# Patient Record
Sex: FEMALE | Race: WHITE | NOT HISPANIC OR LATINO | Employment: OTHER | URBAN - METROPOLITAN AREA
[De-identification: names, ages, dates, MRNs, and addresses within clinical notes are randomized per-mention and may not be internally consistent; named-entity substitution may affect disease eponyms.]

---

## 2023-09-13 PROBLEM — K04.7 ABSCESS, DENTAL: Status: ACTIVE | Noted: 2023-09-13

## 2023-09-13 PROBLEM — K21.9 GASTROESOPHAGEAL REFLUX DISEASE: Status: ACTIVE | Noted: 2023-09-13

## 2023-09-13 PROBLEM — E78.00 PURE HYPERCHOLESTEROLEMIA: Status: ACTIVE | Noted: 2023-09-13

## 2023-09-13 PROBLEM — R73.01 IMPAIRED FASTING GLUCOSE: Status: ACTIVE | Noted: 2023-09-13

## 2023-09-13 PROBLEM — S89.90XA INJURY OF LOWER EXTREMITY: Status: ACTIVE | Noted: 2023-09-13

## 2023-09-13 PROBLEM — S99.929A INJURY OF FOOT: Status: ACTIVE | Noted: 2023-09-13

## 2023-09-13 PROBLEM — G44.209 TENSION HEADACHE: Status: ACTIVE | Noted: 2023-09-13

## 2023-09-13 PROBLEM — M79.669 CALF PAIN: Status: ACTIVE | Noted: 2023-09-13

## 2023-09-13 PROBLEM — G45.3 AMAUROSIS FUGAX: Status: ACTIVE | Noted: 2023-09-13

## 2023-09-13 PROBLEM — F41.8 MIXED ANXIETY AND DEPRESSIVE DISORDER: Status: ACTIVE | Noted: 2023-09-13

## 2023-09-13 PROBLEM — R53.1 ASTHENIA: Status: ACTIVE | Noted: 2023-09-13

## 2023-09-13 PROBLEM — R20.2 PARESTHESIA: Status: ACTIVE | Noted: 2023-09-13

## 2023-09-13 PROBLEM — H53.9 DISORDER OF VISION: Status: ACTIVE | Noted: 2023-09-13

## 2023-09-13 PROBLEM — H53.9 VISUAL DISTURBANCE: Status: ACTIVE | Noted: 2023-09-13

## 2023-09-13 PROBLEM — G45.9 TRANSIENT ISCHEMIC ATTACK: Status: ACTIVE | Noted: 2023-09-13

## 2023-09-13 PROBLEM — R09.1 PLEURISY: Status: ACTIVE | Noted: 2023-09-13

## 2023-09-13 PROBLEM — R07.9 CHEST PAIN: Status: ACTIVE | Noted: 2023-09-13

## 2023-09-13 PROBLEM — I10 HYPERTENSIVE DISORDER: Status: ACTIVE | Noted: 2023-09-13

## 2023-09-13 RX ORDER — ACETAMINOPHEN 325 MG/1
650 TABLET ORAL EVERY 6 HOURS PRN
COMMUNITY
Start: 2016-07-18

## 2023-09-13 RX ORDER — IBUPROFEN 600 MG/1
600 TABLET ORAL EVERY 6 HOURS PRN
COMMUNITY
Start: 2016-07-18 | End: 2024-03-05 | Stop reason: ALTCHOICE

## 2023-09-13 RX ORDER — ACETAMINOPHEN, ASPIRIN (NSAID), AND CAFFEINE 250; 250; 65 MG/1; MG/1; MG/1
1 TABLET, FILM COATED ORAL
COMMUNITY
End: 2024-03-05 | Stop reason: ALTCHOICE

## 2023-09-13 RX ORDER — PRAVASTATIN SODIUM 20 MG/1
20 TABLET ORAL DAILY
COMMUNITY
Start: 2016-05-17 | End: 2023-10-31 | Stop reason: ALTCHOICE

## 2023-09-13 RX ORDER — ATORVASTATIN CALCIUM 40 MG/1
40 TABLET, FILM COATED ORAL DAILY
COMMUNITY
Start: 2021-12-17 | End: 2024-03-05 | Stop reason: ALTCHOICE

## 2023-09-13 RX ORDER — FLUOXETINE HYDROCHLORIDE 20 MG/1
20 CAPSULE ORAL EVERY MORNING
COMMUNITY
Start: 2016-05-17 | End: 2024-03-05 | Stop reason: ALTCHOICE

## 2023-09-13 RX ORDER — METHOCARBAMOL 500 MG/1
500 TABLET, FILM COATED ORAL 3 TIMES DAILY PRN
COMMUNITY
Start: 2016-07-18 | End: 2023-10-31 | Stop reason: ALTCHOICE

## 2023-09-13 RX ORDER — EPINEPHRINE 0.22MG
100 AEROSOL WITH ADAPTER (ML) INHALATION DAILY
COMMUNITY
Start: 2013-01-25

## 2023-09-13 RX ORDER — OMEPRAZOLE 20 MG/1
20 TABLET, DELAYED RELEASE ORAL DAILY
COMMUNITY
End: 2024-03-05 | Stop reason: ALTCHOICE

## 2023-09-13 RX ORDER — LISINOPRIL AND HYDROCHLOROTHIAZIDE 20; 25 MG/1; MG/1
1 TABLET ORAL DAILY
COMMUNITY
Start: 2016-05-17 | End: 2024-01-22

## 2023-09-13 RX ORDER — PRAVASTATIN SODIUM 40 MG/1
40 TABLET ORAL DAILY
COMMUNITY
End: 2024-05-02 | Stop reason: SDUPTHER

## 2023-09-13 RX ORDER — SERTRALINE HYDROCHLORIDE 50 MG/1
50 TABLET, FILM COATED ORAL NIGHTLY
COMMUNITY
Start: 2021-12-17 | End: 2023-10-31 | Stop reason: ALTCHOICE

## 2023-09-13 RX ORDER — LANOLIN ALCOHOL/MO/W.PET/CERES
1 CREAM (GRAM) TOPICAL DAILY
COMMUNITY

## 2023-09-13 RX ORDER — CLOPIDOGREL BISULFATE 75 MG/1
75 TABLET ORAL DAILY
COMMUNITY
End: 2023-11-02

## 2023-10-18 ENCOUNTER — HOSPITAL ENCOUNTER (EMERGENCY)
Facility: HOSPITAL | Age: 63
Discharge: HOME | End: 2023-10-18
Attending: EMERGENCY MEDICINE

## 2023-10-18 ENCOUNTER — APPOINTMENT (OUTPATIENT)
Dept: RADIOLOGY | Facility: HOSPITAL | Age: 63
End: 2023-10-18

## 2023-10-18 VITALS
TEMPERATURE: 98.1 F | OXYGEN SATURATION: 96 % | RESPIRATION RATE: 18 BRPM | DIASTOLIC BLOOD PRESSURE: 90 MMHG | BODY MASS INDEX: 23.04 KG/M2 | WEIGHT: 125.22 LBS | HEIGHT: 62 IN | SYSTOLIC BLOOD PRESSURE: 156 MMHG | HEART RATE: 87 BPM

## 2023-10-18 DIAGNOSIS — R05.1 ACUTE COUGH: ICD-10-CM

## 2023-10-18 DIAGNOSIS — J40 BRONCHITIS: Primary | ICD-10-CM

## 2023-10-18 LAB
FLUAV RNA RESP QL NAA+PROBE: NOT DETECTED
FLUBV RNA RESP QL NAA+PROBE: NOT DETECTED
SARS-COV-2 RNA RESP QL NAA+PROBE: NOT DETECTED

## 2023-10-18 PROCEDURE — 99283 EMERGENCY DEPT VISIT LOW MDM: CPT | Mod: 25 | Performed by: EMERGENCY MEDICINE

## 2023-10-18 PROCEDURE — 94640 AIRWAY INHALATION TREATMENT: CPT

## 2023-10-18 PROCEDURE — 71046 X-RAY EXAM CHEST 2 VIEWS: CPT

## 2023-10-18 PROCEDURE — 2500000002 HC RX 250 W HCPCS SELF ADMINISTERED DRUGS (ALT 637 FOR MEDICARE OP, ALT 636 FOR OP/ED): Performed by: EMERGENCY MEDICINE

## 2023-10-18 PROCEDURE — 87635 SARS-COV-2 COVID-19 AMP PRB: CPT | Performed by: EMERGENCY MEDICINE

## 2023-10-18 PROCEDURE — 2500000001 HC RX 250 WO HCPCS SELF ADMINISTERED DRUGS (ALT 637 FOR MEDICARE OP): Performed by: EMERGENCY MEDICINE

## 2023-10-18 RX ORDER — BENZONATATE 100 MG/1
200 CAPSULE ORAL ONCE
Status: COMPLETED | OUTPATIENT
Start: 2023-10-18 | End: 2023-10-18

## 2023-10-18 RX ORDER — BENZONATATE 100 MG/1
100 CAPSULE ORAL 3 TIMES DAILY PRN
Qty: 15 CAPSULE | Refills: 0 | Status: SHIPPED | OUTPATIENT
Start: 2023-10-18 | End: 2023-10-31 | Stop reason: ALTCHOICE

## 2023-10-18 RX ORDER — IPRATROPIUM BROMIDE AND ALBUTEROL SULFATE 2.5; .5 MG/3ML; MG/3ML
3 SOLUTION RESPIRATORY (INHALATION) ONCE
Status: COMPLETED | OUTPATIENT
Start: 2023-10-18 | End: 2023-10-18

## 2023-10-18 RX ORDER — ALBUTEROL SULFATE 90 UG/1
2 AEROSOL, METERED RESPIRATORY (INHALATION) EVERY 4 HOURS PRN
Qty: 18 G | Refills: 0 | Status: SHIPPED | OUTPATIENT
Start: 2023-10-18

## 2023-10-18 RX ADMIN — BENZONATATE 200 MG: 100 CAPSULE ORAL at 20:48

## 2023-10-18 RX ADMIN — IPRATROPIUM BROMIDE AND ALBUTEROL SULFATE 3 ML: .5; 2.5 SOLUTION RESPIRATORY (INHALATION) at 20:49

## 2023-10-18 ASSESSMENT — PAIN DESCRIPTION - PAIN TYPE: TYPE: ACUTE PAIN

## 2023-10-18 ASSESSMENT — COLUMBIA-SUICIDE SEVERITY RATING SCALE - C-SSRS
2. HAVE YOU ACTUALLY HAD ANY THOUGHTS OF KILLING YOURSELF?: NO
6. HAVE YOU EVER DONE ANYTHING, STARTED TO DO ANYTHING, OR PREPARED TO DO ANYTHING TO END YOUR LIFE?: NO
1. IN THE PAST MONTH, HAVE YOU WISHED YOU WERE DEAD OR WISHED YOU COULD GO TO SLEEP AND NOT WAKE UP?: NO

## 2023-10-18 ASSESSMENT — PAIN DESCRIPTION - PROGRESSION: CLINICAL_PROGRESSION: NOT CHANGED

## 2023-10-18 ASSESSMENT — PAIN DESCRIPTION - ONSET: ONSET: SUDDEN

## 2023-10-18 ASSESSMENT — ENCOUNTER SYMPTOMS: COUGH: 1

## 2023-10-18 ASSESSMENT — PAIN DESCRIPTION - LOCATION: LOCATION: BACK

## 2023-10-18 ASSESSMENT — PAIN - FUNCTIONAL ASSESSMENT: PAIN_FUNCTIONAL_ASSESSMENT: 0-10

## 2023-10-18 ASSESSMENT — PAIN DESCRIPTION - FREQUENCY: FREQUENCY: CONSTANT/CONTINUOUS

## 2023-10-18 ASSESSMENT — PAIN SCALES - GENERAL: PAINLEVEL_OUTOF10: 7

## 2023-10-19 NOTE — ED PROVIDER NOTES
HPI   Chief Complaint   Patient presents with    Cough     For the past 2 weeks I have been coughing and I had chest pain that has gone to my back in the middle the pain is hot I have been expectorating yellow with a runny nose         History provided by:  Patient  Cough  Cough characteristics:  Productive  Sputum characteristics:  Yellow  Severity:  Moderate  Onset quality:  Gradual  Duration:  2 weeks  Timing:  Sporadic  Progression:  Waxing and waning  Chronicity:  New  Smoker: yes    Context: not animal exposure, not exposure to allergens, not sick contacts, not weather changes and not with activity    Relieved by:  Nothing  Worsened by:  Nothing  Ineffective treatments:  Cough suppressants and decongestant  Associated symptoms: chest pain    Associated symptoms: no chills, no diaphoresis, no fever, no headaches, no myalgias, no shortness of breath, no sinus congestion, no sore throat and no wheezing    Associated symptoms comment:  With cough      Review of Systems   Constitutional:  Negative for appetite change, chills, diaphoresis, fatigue and fever.   HENT:  Negative for congestion, postnasal drip and sore throat.    Respiratory:  Positive for cough and chest tightness. Negative for shortness of breath and wheezing.    Cardiovascular:  Positive for chest pain. Negative for palpitations.   Musculoskeletal:  Negative for myalgias.   Neurological:  Negative for dizziness and headaches.   All other systems reviewed and are negative.        Patient History   No past medical history on file.  Past Surgical History:   Procedure Laterality Date    CT HEAD ANGIO W AND WO IV CONTRAST  7/23/2022    CT HEAD ANGIO W AND WO IV CONTRAST [facility] EMERGENCY LEGACY    MR HEAD ANGIO WO IV CONTRAST  7/23/2022    MR HEAD ANGIO WO IV CONTRAST [facility]     Family History   Problem Relation Name Age of Onset    Diabetes Mother      Hypertension Mother      Diabetes Sister      Diabetes Brother       Social History      Tobacco Use    Smoking status: Not on file    Smokeless tobacco: Not on file   Substance Use Topics    Alcohol use: Not on file    Drug use: Not on file       Physical Exam   ED Triage Vitals [10/18/23 1917]   Temp Heart Rate Resp BP   36.7 °C (98.1 °F) 87 18 156/90      SpO2 Temp Source Heart Rate Source Patient Position   96 % Oral Monitor Sitting      BP Location FiO2 (%)     Left arm --       Physical Exam  Vitals and nursing note reviewed.   Constitutional:       Appearance: Normal appearance.   Cardiovascular:      Rate and Rhythm: Normal rate and regular rhythm.   Pulmonary:      Effort: Pulmonary effort is normal. No respiratory distress.      Breath sounds: Wheezing present. No rhonchi.   Chest:      Chest wall: No tenderness.   Skin:     General: Skin is warm and dry.   Neurological:      Mental Status: She is alert and oriented to person, place, and time.         ED Course & MDM   ED Course as of 10/20/23 0030   Fri Oct 20, 2023   0030 XR chest 2 views  No acute process. [TJ]   0030 Flu A Result: Not Detected [TJ]   0030 Flu B Result: Not Detected [TJ]   0030 Coronavirus 2019, PCR: Not Detected [TJ]      ED Course User Index  [TJ] Yoana ARRIAZA MD         Diagnoses as of 10/20/23 0030   Bronchitis   Acute cough       Medical Decision Making  Persistent cough  Differential diagnosis:pneumonia, viral URI, bronchitis  Duoneb and tessalon pearles ordered.  Patient feels better after treatment.    Chest xray results, outpatient management, and follow up instructions discussed at bedside.        Procedure  Procedures     Yoana ARRIAZA MD  10/20/23 0033

## 2023-10-20 ASSESSMENT — ENCOUNTER SYMPTOMS
HEADACHES: 0
FATIGUE: 0
PALPITATIONS: 0
APPETITE CHANGE: 0
MYALGIAS: 0
CHEST TIGHTNESS: 1
DIAPHORESIS: 0
WHEEZING: 0
DIZZINESS: 0
SINUS CONGESTION: 0
SORE THROAT: 0
SHORTNESS OF BREATH: 0
FEVER: 0
CHILLS: 0

## 2023-10-25 ENCOUNTER — TELEPHONE (OUTPATIENT)
Dept: PRIMARY CARE | Facility: CLINIC | Age: 63
End: 2023-10-25

## 2023-10-25 NOTE — TELEPHONE ENCOUNTER
Patient was at [facility] ED 1 week ago hot high BP and cough. gave her inhaler and cough syriup. ER fu 10/31. wants to know if anything is recommended until appt. Uses [employer] on [address]. Call back # [phone]

## 2023-10-25 NOTE — TELEPHONE ENCOUNTER
Spoke with patient and notified her that at this time there's not going to be much that can be done so keep her follow up appt this coming Tuesday and it'll be addressed further, stated thanks and understanding

## 2023-10-25 NOTE — TELEPHONE ENCOUNTER
Chest xray neg, continue treatmetn as directed will see 10/31. Dolores Martinez( Alex, quite confussing).

## 2023-10-31 ENCOUNTER — OFFICE VISIT (OUTPATIENT)
Dept: PRIMARY CARE | Facility: CLINIC | Age: 63
End: 2023-10-31

## 2023-10-31 VITALS
HEART RATE: 87 BPM | TEMPERATURE: 97.8 F | OXYGEN SATURATION: 96 % | BODY MASS INDEX: 22.26 KG/M2 | HEIGHT: 62 IN | SYSTOLIC BLOOD PRESSURE: 104 MMHG | DIASTOLIC BLOOD PRESSURE: 61 MMHG | WEIGHT: 121 LBS

## 2023-10-31 DIAGNOSIS — G44.209 TENSION HEADACHE: ICD-10-CM

## 2023-10-31 DIAGNOSIS — I10 PRIMARY HYPERTENSION: ICD-10-CM

## 2023-10-31 DIAGNOSIS — K21.9 GASTROESOPHAGEAL REFLUX DISEASE WITHOUT ESOPHAGITIS: ICD-10-CM

## 2023-10-31 DIAGNOSIS — R73.01 IMPAIRED FASTING GLUCOSE: ICD-10-CM

## 2023-10-31 DIAGNOSIS — F41.8 MIXED ANXIETY AND DEPRESSIVE DISORDER: ICD-10-CM

## 2023-10-31 DIAGNOSIS — E78.00 PURE HYPERCHOLESTEROLEMIA: ICD-10-CM

## 2023-10-31 DIAGNOSIS — J41.1 MUCOPURULENT CHRONIC BRONCHITIS (MULTI): Primary | ICD-10-CM

## 2023-10-31 PROBLEM — S99.929A INJURY OF FOOT: Status: RESOLVED | Noted: 2023-09-13 | Resolved: 2023-10-31

## 2023-10-31 PROBLEM — K04.7 ABSCESS, DENTAL: Status: RESOLVED | Noted: 2023-09-13 | Resolved: 2023-10-31

## 2023-10-31 PROBLEM — Z86.73 HISTORY OF TIA (TRANSIENT ISCHEMIC ATTACK): Status: ACTIVE | Noted: 2023-10-31

## 2023-10-31 PROCEDURE — 3078F DIAST BP <80 MM HG: CPT | Performed by: INTERNAL MEDICINE

## 2023-10-31 PROCEDURE — 3074F SYST BP LT 130 MM HG: CPT | Performed by: INTERNAL MEDICINE

## 2023-10-31 PROCEDURE — 99212 OFFICE O/P EST SF 10 MIN: CPT | Performed by: INTERNAL MEDICINE

## 2023-10-31 RX ORDER — DOXYCYCLINE 100 MG/1
100 CAPSULE ORAL 2 TIMES DAILY
Qty: 20 CAPSULE | Refills: 0 | Status: SHIPPED | OUTPATIENT
Start: 2023-10-31 | End: 2023-11-10

## 2023-10-31 RX ORDER — FLUTICASONE FUROATE AND VILANTEROL 200; 25 UG/1; UG/1
1 POWDER RESPIRATORY (INHALATION) DAILY
Qty: 3 EACH | Refills: 3 | Status: SHIPPED | OUTPATIENT
Start: 2023-10-31 | End: 2024-10-30

## 2023-10-31 ASSESSMENT — ENCOUNTER SYMPTOMS
SHORTNESS OF BREATH: 0
OCCASIONAL FEELINGS OF UNSTEADINESS: 0
DEPRESSION: 0
PALPITATIONS: 0
LOSS OF SENSATION IN FEET: 0

## 2023-10-31 ASSESSMENT — PATIENT HEALTH QUESTIONNAIRE - PHQ9
1. LITTLE INTEREST OR PLEASURE IN DOING THINGS: NOT AT ALL
2. FEELING DOWN, DEPRESSED OR HOPELESS: NOT AT ALL
SUM OF ALL RESPONSES TO PHQ9 QUESTIONS 1 AND 2: 0

## 2023-10-31 ASSESSMENT — PAIN SCALES - GENERAL: PAINLEVEL: 0-NO PAIN

## 2023-10-31 NOTE — PROGRESS NOTES
[facility]: [facility]  PROGRESS NOTE      Dolores Martinez is a 63 y.o. female that is presenting today for ER fu elevated BP and cough.    Assessment/Plan   Diagnoses and all orders for this visit:  Mucopurulent chronic bronchitis (CMS/HCC)  Comments:  CXR, OK, no flu or COVID 19 10/13.I suspect COPD related tobacco use. Breo pateint assitance program, cover with doxycyline 100 mg twice a day for 10 days.  Orders:  -     fluticasone furoate-vilanteroL (Breo Ellipta) 200-25 mcg/dose inhaler; Inhale 1 puff once daily.  -     doxycycline (Vibramycin) 100 mg capsule; Take 1 capsule (100 mg) by mouth 2 times a day for 10 days. Take with at least 8 ounces (large glass) of water, do not lie down for 30 minutes after  Primary hypertension  Pure hypercholesterolemia  Impaired fasting glucose  Gastroesophageal reflux disease without esophagitis  Mixed anxiety and depressive disorder  Tension headache    Subjective   [facility] 10/13 flu, COVID neg, CXR neg. Albuterol not much help, 1 month. Some GERD ,prilosec.  Yellow phlegm Less than $3K per moth.      Review of Systems   Respiratory:  Negative for shortness of breath.    Cardiovascular:  Negative for chest pain and palpitations.   All other systems reviewed and are negative.     Objective   There were no vitals filed for this visit.   There is no height or weight on file to calculate BMI.  Physical Exam  Constitutional:       General: She is not in acute distress.  HENT:      Head: Normocephalic and atraumatic.      Right Ear: Tympanic membrane normal.      Left Ear: Tympanic membrane normal.      Mouth/Throat:      Mouth: Mucous membranes are moist.      Pharynx: Oropharynx is clear.   Eyes:      Extraocular Movements: Extraocular movements intact.      Conjunctiva/sclera: Conjunctivae normal.      Pupils: Pupils are equal, round, and reactive to light.   Cardiovascular:      Rate and Rhythm: Normal rate and regular rhythm.   Pulmonary:       "Breath sounds: Rhonchi (B post.) present.   Abdominal:      General: Bowel sounds are normal.      Palpations: Abdomen is soft. There is no mass.   Genitourinary:     Vagina: Vaginal discharge present.   Musculoskeletal:         General: Normal range of motion.      Cervical back: Neck supple. No tenderness.   Skin:     General: Skin is warm and dry.   Neurological:      General: No focal deficit present.      Mental Status: She is oriented to person, place, and time.       Diagnostic Results   Lab Results   Component Value Date    GLUCOSE 102 (H) 08/04/2023    CALCIUM 9.5 08/04/2023     08/04/2023    K 4.0 08/04/2023    CO2 23 (L) 08/04/2023     08/04/2023    BUN 22 08/04/2023    CREATININE 0.9 08/04/2023     Lab Results   Component Value Date    ALT 12 08/04/2023    AST 11 08/04/2023    ALKPHOS 72 08/04/2023    BILITOT 0.2 08/04/2023     Lab Results   Component Value Date    WBC 7.6 12/23/2022    HGB 13.3 12/23/2022    HCT 38.8 12/23/2022    MCV 87.4 12/23/2022     12/23/2022     Lab Results   Component Value Date    CHOL 169 07/23/2022    CHOL 174 06/22/2022    CHOL 202 (H) 12/13/2021     Lab Results   Component Value Date    HDL 56 07/23/2022    HDL 51 06/22/2022    HDL 62 12/13/2021     Lab Results   Component Value Date    LDLCALC 82 07/23/2022    LDLCALC 94 06/22/2022    LDLCALC 114 12/13/2021     Lab Results   Component Value Date    TRIG 155 (H) 07/23/2022    TRIG 144 06/22/2022    TRIG 129 12/13/2021     No components found for: \"CHOLHDL\"  Lab Results   Component Value Date    HGBA1C 5.8 12/13/2021     Other labs not included in the list above were reviewed either before or during this encounter.    History    Past Medical History:   Diagnosis Date    Abscess, dental 09/13/2023    Gastroesophageal reflux disease 09/13/2023    Impaired fasting glucose 09/13/2023    Injury of foot 09/13/2023    Mixed anxiety and depressive disorder 09/13/2023    Pure hypercholesterolemia 09/13/2023    " Tension headache 09/13/2023     Past Surgical History:   Procedure Laterality Date    CT HEAD ANGIO W AND WO IV CONTRAST  7/23/2022    CT HEAD ANGIO W AND WO IV CONTRAST [facility]    MR HEAD ANGIO WO IV CONTRAST  7/23/2022    MR HEAD ANGIO WO IV CONTRAST [facility]     Family History   Problem Relation Name Age of Onset    Diabetes Mother      Hypertension Mother      Diabetes Sister      Diabetes Brother       Social History     Socioeconomic History    Marital status:      Spouse name: Not on file    Number of children: Not on file    Years of education: Not on file    Highest education level: Not on file   Occupational History    Not on file   Tobacco Use    Smoking status: Not on file    Smokeless tobacco: Not on file   Substance and Sexual Activity    Alcohol use: Not on file    Drug use: Not on file    Sexual activity: Not on file   Other Topics Concern    Not on file   Social History Narrative    Not on file     Social Determinants of Health     Financial Resource Strain: Not on file   Food Insecurity: Not on file   Transportation Needs: Not on file   Physical Activity: Not on file   Stress: Not on file   Social Connections: Not on file   Intimate Partner Violence: Not on file   Housing Stability: Not on file     No Known Allergies  Current Outpatient Medications on File Prior to Visit   Medication Sig Dispense Refill    acetaminophen (Tylenol) 325 mg tablet Take 2 tablets (650 mg) by mouth every 6 hours if needed (pain).      albuterol 90 mcg/actuation inhaler Inhale 2 puffs every 4 hours if needed for wheezing or shortness of breath (cough). Use with spacer 18 g 0    aspirin-acetaminophen-caffeine (Excedrin Extra Strength) 250-250-65 mg tablet Take 1 tablet by mouth. 1-2 times per week      atorvastatin (Lipitor) 40 mg tablet Take 1 tablet (40 mg) by mouth once daily.      clopidogrel (Plavix) 75 mg tablet Take 1 tablet (75 mg) by mouth once daily.      coenzyme Q-10 100 mg  capsule Take 1 capsule (100 mg) by mouth once daily. With a meal      cyanocobalamin (Vitamin B-12) 1,000 mcg tablet Take 1 tablet (1,000 mcg) by mouth once daily.      FLUoxetine (PROzac) 20 mg capsule Take 1 capsule (20 mg) by mouth once daily in the morning.      ibuprofen 600 mg tablet Take 1 tablet (600 mg) by mouth every 6 hours if needed (pain).      lisinopriL-hydrochlorothiazide 20-25 mg tablet Take 1 tablet by mouth once daily.      methocarbamol (Robaxin) 500 mg tablet Take 1 tablet (500 mg) by mouth 3 times a day as needed (pain).      multivit-min/vit C/herb no.124 (AIRBORNE, ASCORBIC ACID, ORAL) Orally      mv-mn/C/glutamin/lysin/rzmd185 (AIRBORNE, ASCORBATE SODIUM, ORAL) Orally      omeprazole OTC (PriLOSEC OTC) 20 mg EC tablet Take 1 tablet (20 mg) by mouth once daily.      pravastatin (Pravachol) 20 mg tablet Take 1 tablet (20 mg) by mouth once daily.      pravastatin (Pravachol) 40 mg tablet Take 1 tablet (40 mg) by mouth once daily.      sertraline (Zoloft) 50 mg tablet Take 1 tablet (50 mg) by mouth once daily at bedtime.      [DISCONTINUED] benzonatate (Tessalon) 100 mg capsule Take 1 capsule (100 mg) by mouth 3 times a day as needed for cough. Do not crush or chew. 15 capsule 0     No current facility-administered medications on file prior to visit.     Immunization History   Administered Date(s) Administered    Flu vaccine, quadrivalent, recombinant, preservative free, adult (FLUBLOK) 01/13/2023    Pfizer Purple Cap SARS-CoV-2 04/05/2021, 05/03/2021    Tdap vaccine, age 7 year and older (BOOSTRIX) 01/01/1999, 04/27/2014     Patient's medical history was reviewed and updated either before or during this encounter.    Morteza Elias MD

## 2023-10-31 NOTE — PATIENT INSTRUCTIONS
Flu vaccine, RSV, Prevnar 20 at local pharmacies.    Diagnoses and all orders for this visit:  Mucopurulent chronic bronchitis (CMS/[facility])  Comments:  CXR, OK, no flu or COVID 19 10/13.I suspect COPD related tobacco use. Breo pateint assitance program, cover with doxycyline 100 mg twice a day for 10 days. Make sure rinse and spit after daily use, to prevent thrush. Combination of steroid and long acting lung dilator. Can still use albuterol as needed.  Orders:  -     fluticasone furoate-vilanteroL (Breo Ellipta) 200-25 mcg/dose inhaler; Inhale 1 puff once daily. [facility] pharmacist will fax in RX for patient assistance.  -     doxycycline (Vibramycin) 100 mg capsule; Take 1 capsule (100 mg) by mouth 2 times a day for 10 days. Take with at least 8 ounces (large glass) of water, do not lie down for 30 minutes after  Primary hypertension  Pure hypercholesterolemia  Impaired fasting glucose  Gastroesophageal reflux disease without esophagitis  Mixed anxiety and depressive disorder  Tension headache

## 2023-11-01 DIAGNOSIS — Z86.73 HISTORY OF TIA (TRANSIENT ISCHEMIC ATTACK): ICD-10-CM

## 2023-11-02 RX ORDER — CLOPIDOGREL BISULFATE 75 MG/1
75 TABLET ORAL DAILY
Qty: 90 TABLET | Refills: 2 | Status: SHIPPED | OUTPATIENT
Start: 2023-11-02

## 2024-01-21 DIAGNOSIS — I10 PRIMARY HYPERTENSION: ICD-10-CM

## 2024-01-22 RX ORDER — LISINOPRIL AND HYDROCHLOROTHIAZIDE 20; 25 MG/1; MG/1
1 TABLET ORAL DAILY
Qty: 90 TABLET | Refills: 2 | Status: ON HOLD | OUTPATIENT
Start: 2024-01-22 | End: 2024-02-27 | Stop reason: SDUPTHER

## 2024-02-26 ENCOUNTER — HOSPITAL ENCOUNTER (OUTPATIENT)
Facility: HOSPITAL | Age: 64
Setting detail: OBSERVATION
Discharge: HOME | End: 2024-02-27
Attending: EMERGENCY MEDICINE | Admitting: INTERNAL MEDICINE

## 2024-02-26 ENCOUNTER — APPOINTMENT (OUTPATIENT)
Dept: CARDIOLOGY | Facility: HOSPITAL | Age: 64
End: 2024-02-26

## 2024-02-26 ENCOUNTER — APPOINTMENT (OUTPATIENT)
Dept: RADIOLOGY | Facility: HOSPITAL | Age: 64
End: 2024-02-26

## 2024-02-26 DIAGNOSIS — R20.2 NUMBNESS AND TINGLING OF LEFT SIDE OF FACE: ICD-10-CM

## 2024-02-26 DIAGNOSIS — R20.0 NUMBNESS AND TINGLING OF LEFT SIDE OF FACE: ICD-10-CM

## 2024-02-26 DIAGNOSIS — I10 PRIMARY HYPERTENSION: ICD-10-CM

## 2024-02-26 DIAGNOSIS — R07.9 CHEST PAIN, UNSPECIFIED TYPE: ICD-10-CM

## 2024-02-26 DIAGNOSIS — R07.89 CHEST WALL PAIN: Primary | ICD-10-CM

## 2024-02-26 LAB
ALBUMIN SERPL-MCNC: 4.4 G/DL (ref 3.5–5)
ALP BLD-CCNC: 67 U/L (ref 35–125)
ALT SERPL-CCNC: 12 U/L (ref 5–40)
ANION GAP SERPL CALC-SCNC: 12 MMOL/L
AST SERPL-CCNC: 11 U/L (ref 5–40)
BASOPHILS # BLD AUTO: 0.04 X10*3/UL (ref 0–0.1)
BASOPHILS NFR BLD AUTO: 0.4 %
BILIRUB SERPL-MCNC: 0.2 MG/DL (ref 0.1–1.2)
BUN SERPL-MCNC: 27 MG/DL (ref 8–25)
CALCIUM SERPL-MCNC: 9.5 MG/DL (ref 8.5–10.4)
CHLORIDE SERPL-SCNC: 102 MMOL/L (ref 97–107)
CO2 SERPL-SCNC: 23 MMOL/L (ref 24–31)
CREAT SERPL-MCNC: 1 MG/DL (ref 0.4–1.6)
EGFRCR SERPLBLD CKD-EPI 2021: 63 ML/MIN/1.73M*2
EOSINOPHIL # BLD AUTO: 0.19 X10*3/UL (ref 0–0.7)
EOSINOPHIL NFR BLD AUTO: 2 %
ERYTHROCYTE [DISTWIDTH] IN BLOOD BY AUTOMATED COUNT: 13.3 % (ref 11.5–14.5)
GLUCOSE SERPL-MCNC: 104 MG/DL (ref 65–99)
HCT VFR BLD AUTO: 37.2 % (ref 36–46)
HGB BLD-MCNC: 12.7 G/DL (ref 12–16)
IMM GRANULOCYTES # BLD AUTO: 0.02 X10*3/UL (ref 0–0.7)
IMM GRANULOCYTES NFR BLD AUTO: 0.2 % (ref 0–0.9)
LYMPHOCYTES # BLD AUTO: 2.27 X10*3/UL (ref 1.2–4.8)
LYMPHOCYTES NFR BLD AUTO: 23.6 %
MCH RBC QN AUTO: 30.2 PG (ref 26–34)
MCHC RBC AUTO-ENTMCNC: 34.1 G/DL (ref 32–36)
MCV RBC AUTO: 88 FL (ref 80–100)
MONOCYTES # BLD AUTO: 0.61 X10*3/UL (ref 0.1–1)
MONOCYTES NFR BLD AUTO: 6.3 %
NEUTROPHILS # BLD AUTO: 6.5 X10*3/UL (ref 1.2–7.7)
NEUTROPHILS NFR BLD AUTO: 67.5 %
NRBC BLD-RTO: 0 /100 WBCS (ref 0–0)
PLATELET # BLD AUTO: 259 X10*3/UL (ref 150–450)
POTASSIUM SERPL-SCNC: 3.6 MMOL/L (ref 3.4–5.1)
PROT SERPL-MCNC: 7.5 G/DL (ref 5.9–7.9)
RBC # BLD AUTO: 4.21 X10*6/UL (ref 4–5.2)
SODIUM SERPL-SCNC: 137 MMOL/L (ref 133–145)
TROPONIN T SERPL-MCNC: 7 NG/L
WBC # BLD AUTO: 9.6 X10*3/UL (ref 4.4–11.3)

## 2024-02-26 PROCEDURE — G0378 HOSPITAL OBSERVATION PER HR: HCPCS

## 2024-02-26 PROCEDURE — 85025 COMPLETE CBC W/AUTO DIFF WBC: CPT | Performed by: EMERGENCY MEDICINE

## 2024-02-26 PROCEDURE — 84484 ASSAY OF TROPONIN QUANT: CPT | Performed by: EMERGENCY MEDICINE

## 2024-02-26 PROCEDURE — 70450 CT HEAD/BRAIN W/O DYE: CPT | Mod: FOREIGN READ | Performed by: RADIOLOGY

## 2024-02-26 PROCEDURE — 99285 EMERGENCY DEPT VISIT HI MDM: CPT | Mod: 25

## 2024-02-26 PROCEDURE — 70450 CT HEAD/BRAIN W/O DYE: CPT

## 2024-02-26 PROCEDURE — 36415 COLL VENOUS BLD VENIPUNCTURE: CPT | Performed by: EMERGENCY MEDICINE

## 2024-02-26 PROCEDURE — 71045 X-RAY EXAM CHEST 1 VIEW: CPT

## 2024-02-26 PROCEDURE — 71045 X-RAY EXAM CHEST 1 VIEW: CPT | Mod: FOREIGN READ | Performed by: RADIOLOGY

## 2024-02-26 PROCEDURE — 93005 ELECTROCARDIOGRAM TRACING: CPT

## 2024-02-26 PROCEDURE — 84075 ASSAY ALKALINE PHOSPHATASE: CPT | Performed by: EMERGENCY MEDICINE

## 2024-02-26 PROCEDURE — 82947 ASSAY GLUCOSE BLOOD QUANT: CPT

## 2024-02-26 RX ORDER — ACETAMINOPHEN 325 MG/1
650 TABLET ORAL EVERY 4 HOURS PRN
Status: DISCONTINUED | OUTPATIENT
Start: 2024-02-26 | End: 2024-02-27 | Stop reason: HOSPADM

## 2024-02-26 RX ORDER — ACETAMINOPHEN 650 MG/1
650 SUPPOSITORY RECTAL EVERY 4 HOURS PRN
Status: DISCONTINUED | OUTPATIENT
Start: 2024-02-26 | End: 2024-02-27 | Stop reason: HOSPADM

## 2024-02-26 RX ORDER — ENOXAPARIN SODIUM 100 MG/ML
40 INJECTION SUBCUTANEOUS DAILY
Status: DISCONTINUED | OUTPATIENT
Start: 2024-02-27 | End: 2024-02-27 | Stop reason: HOSPADM

## 2024-02-26 RX ORDER — ONDANSETRON 4 MG/1
4 TABLET, ORALLY DISINTEGRATING ORAL EVERY 8 HOURS PRN
Status: DISCONTINUED | OUTPATIENT
Start: 2024-02-26 | End: 2024-02-27 | Stop reason: HOSPADM

## 2024-02-26 RX ORDER — CLOPIDOGREL BISULFATE 75 MG/1
75 TABLET ORAL DAILY
Status: DISCONTINUED | OUTPATIENT
Start: 2024-02-27 | End: 2024-02-27 | Stop reason: HOSPADM

## 2024-02-26 RX ORDER — FLUOXETINE HYDROCHLORIDE 20 MG/1
20 CAPSULE ORAL EVERY MORNING
Status: DISCONTINUED | OUTPATIENT
Start: 2024-02-27 | End: 2024-02-27 | Stop reason: HOSPADM

## 2024-02-26 RX ORDER — ASPIRIN 325 MG
325 TABLET ORAL ONCE
Status: DISCONTINUED | OUTPATIENT
Start: 2024-02-26 | End: 2024-02-27 | Stop reason: HOSPADM

## 2024-02-26 RX ORDER — ALBUTEROL SULFATE 0.83 MG/ML
3 SOLUTION RESPIRATORY (INHALATION) EVERY 4 HOURS PRN
Status: DISCONTINUED | OUTPATIENT
Start: 2024-02-26 | End: 2024-02-27 | Stop reason: HOSPADM

## 2024-02-26 RX ORDER — GUAIFENESIN/DEXTROMETHORPHAN 100-10MG/5
5 SYRUP ORAL EVERY 4 HOURS PRN
Status: DISCONTINUED | OUTPATIENT
Start: 2024-02-26 | End: 2024-02-27 | Stop reason: HOSPADM

## 2024-02-26 RX ORDER — ONDANSETRON HYDROCHLORIDE 2 MG/ML
4 INJECTION, SOLUTION INTRAVENOUS EVERY 8 HOURS PRN
Status: DISCONTINUED | OUTPATIENT
Start: 2024-02-26 | End: 2024-02-27 | Stop reason: HOSPADM

## 2024-02-26 RX ORDER — PANTOPRAZOLE SODIUM 40 MG/1
40 TABLET, DELAYED RELEASE ORAL
Status: DISCONTINUED | OUTPATIENT
Start: 2024-02-27 | End: 2024-02-27 | Stop reason: HOSPADM

## 2024-02-26 RX ORDER — POLYETHYLENE GLYCOL 3350 17 G/17G
17 POWDER, FOR SOLUTION ORAL DAILY PRN
Status: DISCONTINUED | OUTPATIENT
Start: 2024-02-26 | End: 2024-02-27 | Stop reason: HOSPADM

## 2024-02-26 RX ORDER — ACETAMINOPHEN 160 MG/5ML
650 SOLUTION ORAL EVERY 4 HOURS PRN
Status: DISCONTINUED | OUTPATIENT
Start: 2024-02-26 | End: 2024-02-27 | Stop reason: HOSPADM

## 2024-02-26 RX ORDER — IBUPROFEN 200 MG
1 TABLET ORAL DAILY
Status: DISCONTINUED | OUTPATIENT
Start: 2024-02-27 | End: 2024-02-27 | Stop reason: HOSPADM

## 2024-02-26 RX ORDER — LANOLIN ALCOHOL/MO/W.PET/CERES
1000 CREAM (GRAM) TOPICAL DAILY
Status: DISCONTINUED | OUTPATIENT
Start: 2024-02-27 | End: 2024-02-27 | Stop reason: HOSPADM

## 2024-02-26 RX ORDER — ATORVASTATIN CALCIUM 40 MG/1
40 TABLET, FILM COATED ORAL NIGHTLY
Status: DISCONTINUED | OUTPATIENT
Start: 2024-02-26 | End: 2024-02-27

## 2024-02-26 RX ORDER — FLUTICASONE FUROATE AND VILANTEROL 200; 25 UG/1; UG/1
1 POWDER RESPIRATORY (INHALATION) DAILY
Status: DISCONTINUED | OUTPATIENT
Start: 2024-02-27 | End: 2024-02-27 | Stop reason: HOSPADM

## 2024-02-26 RX ORDER — GUAIFENESIN 600 MG/1
600 TABLET, EXTENDED RELEASE ORAL EVERY 12 HOURS PRN
Status: DISCONTINUED | OUTPATIENT
Start: 2024-02-26 | End: 2024-02-27 | Stop reason: HOSPADM

## 2024-02-26 ASSESSMENT — PAIN - FUNCTIONAL ASSESSMENT: PAIN_FUNCTIONAL_ASSESSMENT: 0-10

## 2024-02-26 ASSESSMENT — PAIN SCALES - GENERAL: PAINLEVEL_OUTOF10: 2

## 2024-02-27 ENCOUNTER — APPOINTMENT (OUTPATIENT)
Dept: CARDIOLOGY | Facility: HOSPITAL | Age: 64
End: 2024-02-27

## 2024-02-27 VITALS
BODY MASS INDEX: 24.55 KG/M2 | HEIGHT: 61 IN | SYSTOLIC BLOOD PRESSURE: 105 MMHG | HEART RATE: 73 BPM | TEMPERATURE: 97.3 F | WEIGHT: 130 LBS | RESPIRATION RATE: 16 BRPM | DIASTOLIC BLOOD PRESSURE: 61 MMHG | OXYGEN SATURATION: 95 %

## 2024-02-27 LAB
ANION GAP SERPL CALC-SCNC: 10 MMOL/L
BUN SERPL-MCNC: 22 MG/DL (ref 8–25)
CALCIUM SERPL-MCNC: 9.2 MG/DL (ref 8.5–10.4)
CHLORIDE SERPL-SCNC: 105 MMOL/L (ref 97–107)
CHOLEST SERPL-MCNC: 170 MG/DL (ref 133–200)
CHOLEST/HDLC SERPL: 3.7 {RATIO}
CO2 SERPL-SCNC: 24 MMOL/L (ref 24–31)
CREAT SERPL-MCNC: 0.7 MG/DL (ref 0.4–1.6)
EGFRCR SERPLBLD CKD-EPI 2021: >90 ML/MIN/1.73M*2
ERYTHROCYTE [DISTWIDTH] IN BLOOD BY AUTOMATED COUNT: 13.2 % (ref 11.5–14.5)
EST. AVERAGE GLUCOSE BLD GHB EST-MCNC: 117 MG/DL
GLUCOSE BLD MANUAL STRIP-MCNC: 114 MG/DL (ref 74–99)
GLUCOSE SERPL-MCNC: 90 MG/DL (ref 65–99)
HBA1C MFR BLD: 5.7 %
HCT VFR BLD AUTO: 37.6 % (ref 36–46)
HDLC SERPL-MCNC: 46 MG/DL
HGB BLD-MCNC: 12.5 G/DL (ref 12–16)
LDLC SERPL CALC-MCNC: 83 MG/DL (ref 65–130)
MCH RBC QN AUTO: 29.5 PG (ref 26–34)
MCHC RBC AUTO-ENTMCNC: 33.2 G/DL (ref 32–36)
MCV RBC AUTO: 89 FL (ref 80–100)
NRBC BLD-RTO: 0 /100 WBCS (ref 0–0)
PLATELET # BLD AUTO: 239 X10*3/UL (ref 150–450)
POTASSIUM SERPL-SCNC: 3.6 MMOL/L (ref 3.4–5.1)
RBC # BLD AUTO: 4.24 X10*6/UL (ref 4–5.2)
SODIUM SERPL-SCNC: 139 MMOL/L (ref 133–145)
TRIGL SERPL-MCNC: 203 MG/DL (ref 40–150)
WBC # BLD AUTO: 6.9 X10*3/UL (ref 4.4–11.3)

## 2024-02-27 PROCEDURE — 2500000004 HC RX 250 GENERAL PHARMACY W/ HCPCS (ALT 636 FOR OP/ED): Performed by: INTERNAL MEDICINE

## 2024-02-27 PROCEDURE — 93017 CV STRESS TEST TRACING ONLY: CPT

## 2024-02-27 PROCEDURE — 80061 LIPID PANEL: CPT | Performed by: INTERNAL MEDICINE

## 2024-02-27 PROCEDURE — 83036 HEMOGLOBIN GLYCOSYLATED A1C: CPT | Performed by: INTERNAL MEDICINE

## 2024-02-27 PROCEDURE — 2500000002 HC RX 250 W HCPCS SELF ADMINISTERED DRUGS (ALT 637 FOR MEDICARE OP, ALT 636 FOR OP/ED): Performed by: INTERNAL MEDICINE

## 2024-02-27 PROCEDURE — 93018 CV STRESS TEST I&R ONLY: CPT | Performed by: INTERNAL MEDICINE

## 2024-02-27 PROCEDURE — G0378 HOSPITAL OBSERVATION PER HR: HCPCS

## 2024-02-27 PROCEDURE — 99221 1ST HOSP IP/OBS SF/LOW 40: CPT | Performed by: INTERNAL MEDICINE

## 2024-02-27 PROCEDURE — 80048 BASIC METABOLIC PNL TOTAL CA: CPT | Performed by: INTERNAL MEDICINE

## 2024-02-27 PROCEDURE — 36415 COLL VENOUS BLD VENIPUNCTURE: CPT | Performed by: INTERNAL MEDICINE

## 2024-02-27 PROCEDURE — 2500000001 HC RX 250 WO HCPCS SELF ADMINISTERED DRUGS (ALT 637 FOR MEDICARE OP): Performed by: INTERNAL MEDICINE

## 2024-02-27 PROCEDURE — 85027 COMPLETE CBC AUTOMATED: CPT | Performed by: INTERNAL MEDICINE

## 2024-02-27 PROCEDURE — 93016 CV STRESS TEST SUPVJ ONLY: CPT | Performed by: INTERNAL MEDICINE

## 2024-02-27 RX ORDER — LISINOPRIL AND HYDROCHLOROTHIAZIDE 20; 25 MG/1; MG/1
0.5 TABLET ORAL DAILY
Start: 2024-02-27

## 2024-02-27 RX ORDER — PRAVASTATIN SODIUM 40 MG/1
40 TABLET ORAL NIGHTLY
Status: DISCONTINUED | OUTPATIENT
Start: 2024-02-27 | End: 2024-02-27 | Stop reason: HOSPADM

## 2024-02-27 RX ADMIN — ENOXAPARIN SODIUM 40 MG: 40 INJECTION SUBCUTANEOUS at 06:12

## 2024-02-27 RX ADMIN — CLOPIDOGREL BISULFATE 75 MG: 75 TABLET ORAL at 08:29

## 2024-02-27 RX ADMIN — PANTOPRAZOLE SODIUM 40 MG: 40 TABLET, DELAYED RELEASE ORAL at 06:11

## 2024-02-27 RX ADMIN — CYANOCOBALAMIN TAB 1000 MCG 1000 MCG: 1000 TAB at 08:29

## 2024-02-27 RX ADMIN — FLUOXETINE HYDROCHLORIDE 20 MG: 20 CAPSULE ORAL at 08:29

## 2024-02-27 RX ADMIN — PRAVASTATIN SODIUM 40 MG: 40 TABLET ORAL at 01:14

## 2024-02-27 SDOH — SOCIAL STABILITY: SOCIAL INSECURITY: ARE YOU OR HAVE YOU BEEN THREATENED OR ABUSED PHYSICALLY, EMOTIONALLY, OR SEXUALLY BY ANYONE?: NO

## 2024-02-27 SDOH — SOCIAL STABILITY: SOCIAL INSECURITY: DO YOU FEEL ANYONE HAS EXPLOITED OR TAKEN ADVANTAGE OF YOU FINANCIALLY OR OF YOUR PERSONAL PROPERTY?: NO

## 2024-02-27 SDOH — SOCIAL STABILITY: SOCIAL INSECURITY: HAS ANYONE EVER THREATENED TO HURT YOUR FAMILY OR YOUR PETS?: NO

## 2024-02-27 SDOH — SOCIAL STABILITY: SOCIAL INSECURITY: DOES ANYONE TRY TO KEEP YOU FROM HAVING/CONTACTING OTHER FRIENDS OR DOING THINGS OUTSIDE YOUR HOME?: NO

## 2024-02-27 SDOH — SOCIAL STABILITY: SOCIAL INSECURITY: WERE YOU ABLE TO COMPLETE ALL THE BEHAVIORAL HEALTH SCREENINGS?: YES

## 2024-02-27 SDOH — SOCIAL STABILITY: SOCIAL INSECURITY: DO YOU FEEL UNSAFE GOING BACK TO THE PLACE WHERE YOU ARE LIVING?: NO

## 2024-02-27 SDOH — SOCIAL STABILITY: SOCIAL INSECURITY: ABUSE: ADULT

## 2024-02-27 SDOH — SOCIAL STABILITY: SOCIAL INSECURITY: HAVE YOU HAD THOUGHTS OF HARMING ANYONE ELSE?: NO

## 2024-02-27 SDOH — SOCIAL STABILITY: SOCIAL INSECURITY: ARE THERE ANY APPARENT SIGNS OF INJURIES/BEHAVIORS THAT COULD BE RELATED TO ABUSE/NEGLECT?: NO

## 2024-02-27 ASSESSMENT — COGNITIVE AND FUNCTIONAL STATUS - GENERAL
MOBILITY SCORE: 24
DAILY ACTIVITIY SCORE: 24
DAILY ACTIVITIY SCORE: 24
MOBILITY SCORE: 24
PATIENT BASELINE BEDBOUND: NO

## 2024-02-27 ASSESSMENT — ACTIVITIES OF DAILY LIVING (ADL)
DRESSING YOURSELF: INDEPENDENT
TOILETING: INDEPENDENT
JUDGMENT_ADEQUATE_SAFELY_COMPLETE_DAILY_ACTIVITIES: YES
WALKS IN HOME: INDEPENDENT
GROOMING: INDEPENDENT
HEARING - RIGHT EAR: FUNCTIONAL
PATIENT'S MEMORY ADEQUATE TO SAFELY COMPLETE DAILY ACTIVITIES?: YES
FEEDING YOURSELF: INDEPENDENT
HEARING - LEFT EAR: FUNCTIONAL
LACK_OF_TRANSPORTATION: NO
ADEQUATE_TO_COMPLETE_ADL: YES
BATHING: INDEPENDENT

## 2024-02-27 ASSESSMENT — PATIENT HEALTH QUESTIONNAIRE - PHQ9
SUM OF ALL RESPONSES TO PHQ9 QUESTIONS 1 & 2: 0
2. FEELING DOWN, DEPRESSED OR HOPELESS: NOT AT ALL
1. LITTLE INTEREST OR PLEASURE IN DOING THINGS: NOT AT ALL

## 2024-02-27 ASSESSMENT — LIFESTYLE VARIABLES
AUDIT-C TOTAL SCORE: 0
SUBSTANCE_ABUSE_PAST_12_MONTHS: NO
HOW OFTEN DO YOU HAVE 6 OR MORE DRINKS ON ONE OCCASION: NEVER
AUDIT-C TOTAL SCORE: 0
SKIP TO QUESTIONS 9-10: 1
HOW OFTEN DO YOU HAVE A DRINK CONTAINING ALCOHOL: NEVER
HOW MANY STANDARD DRINKS CONTAINING ALCOHOL DO YOU HAVE ON A TYPICAL DAY: PATIENT DOES NOT DRINK
PRESCIPTION_ABUSE_PAST_12_MONTHS: NO

## 2024-02-27 ASSESSMENT — PAIN - FUNCTIONAL ASSESSMENT: PAIN_FUNCTIONAL_ASSESSMENT: 0-10

## 2024-02-27 ASSESSMENT — PAIN SCALES - GENERAL: PAINLEVEL_OUTOF10: 0 - NO PAIN

## 2024-02-27 NOTE — CONSULTS
"Inpatient consult to Cardiology  Consult performed by: Keven Leal MD  Consult ordered by: cOhoa Pettit MD  Reason for consult: chest pain        History Of Present Illness:    Dolores Martinez is a 63 y.o. female presenting with chest pain.  She does have a history of hypertension smoking history and remote history of TIA.  Proximately 2 weeks ago she started complaining of blurred vision and some dizziness.  This would come and go.  However the past week to few days she has been having complaints of chest discomforts she describes a somewhat sharp pain located left side of her chest over her left breast.  It would last maybe 5 to 10 minutes.  It is a sharp discomfort.  She is not short of breath or diaphoretic with it.  She does not know what brings it on most the time she is just sitting and has woken her from sleep.  She came to the emergency room chest wall was tender.  She has not fallen or hit her self recently.  EKG shows normal sinus rhythm and troponin was 7.  She has never had a myocardial infarction.  She not complain symptoms of PND orthopnea.  She does states she has some leg problems they get tired when she walks.  She never had rheumatic fever or told she had a heart murmur.  She says she may have had a stress test in the past she is not aware when it was.     Last Recorded Vitals:  Vitals:    02/26/24 2300 02/26/24 2356 02/27/24 0311 02/27/24 0717   BP: 113/56 111/64 97/57 98/62   BP Location:  Right arm Right arm Right arm   Patient Position:  Lying Lying Lying   Pulse: 77 70 75 80   Resp: (!) 22 17 15 16   Temp:  36.1 °C (97 °F) 35.9 °C (96.6 °F) 36.3 °C (97.3 °F)   TempSrc:  Temporal Temporal Temporal   SpO2: 94% 97% 95% 96%   Weight:  59 kg (130 lb)     Height:  1.549 m (5' 1\")         Last Labs:  CBC - 2/27/2024:  5:06 AM  6.9 12.5 239    37.6      CMP - 2/27/2024:  5:06 AM  9.2 7.5 11 --- 0.2   _ 4.4 12 67      PTT - No results in last year.  _   _ _     Hemoglobin A1C   Date/Time Value Ref " Range Status   02/27/2024 05:06 AM 5.7 (H) See below % Final   12/13/2021 09:36 AM 5.8 4.0 - 6.0 % Final     Comment:     Hemoglobin A1C levels are related to mean blood glucose during the   preceding 2-3 months. The relationship table below may be used as a   general guide. Each 1% increase in HGB A1C is a reflection of an   increase in mean glucose of approximately 30 mg/dl.   Reference: Diabetes Care, volume 29, supplement 1 Jan. 2006                        HGB A1C ................. Approx. Mean Glucose   _______________________________________________   6%   ...............................  120 mg/dl   7%   ...............................  150 mg/dl   8%   ...............................  180 mg/dl   9%   ...............................  210 mg/dl   10%  ...............................  240 mg/dl  Performed at [address] [address]     08/27/2019 09:47 AM 5.3 4.0 - 6.0 % Final     Comment:     Hemoglobin A1C levels are related to mean blood glucose during the   preceding 2-3 months. The relationship table below may be used as a   general guide. Each 1% increase in HGB A1C is a reflection of an   increase in mean glucose of approximately 30 mg/dl.   Reference: Diabetes Care, volume 29, supplement 1 Jan. 2006                        HGB A1C ................. Approx. Mean Glucose   _______________________________________________   6%   ...............................  120 mg/dl   7%   ...............................  150 mg/dl   8%   ...............................  180 mg/dl   9%   ...............................  210 mg/dl   10%  ...............................  240 mg/dl  Performed at [place] [address] [facility] [address]       LDL Calculated   Date/Time Value Ref Range Status   02/27/2024 05:06 AM 83 65 - 130 mg/dL Final   07/23/2022 02:27 AM 82 65 - 130 MG/DL Final   06/22/2022 09:42 AM 94 65 - 130 MG/DL Final   12/13/2021 09:36  65 - 130 MG/DL Final      Last I/O:  No  "intake/output data recorded.    Past Cardiology Tests (Last 3 Years):  EKG:  Normal sinus rhythm    Echo:  No results found for this or any previous visit from the past 1095 days.    Ejection Fractions:  No results found for: \"EF\"  Cath:  No results found for this or any previous visit from the past 1095 days.    Stress Test:  No results found for this or any previous visit from the past 1095 days.    Cardiac Imaging:  No results found for this or any previous visit from the past 1095 days.      Past Medical History:  She has a past medical history of Abscess, dental (09/13/2023), Gastroesophageal reflux disease (09/13/2023), Impaired fasting glucose (09/13/2023), Injury of foot (09/13/2023), Mixed anxiety and depressive disorder (09/13/2023), Pure hypercholesterolemia (09/13/2023), and Tension headache (09/13/2023).    Past Surgical History:  She has a past surgical history that includes MR angio head wo IV contrast (7/23/2022) and CT angio head w and wo IV contrast (7/23/2022).  Hysterectomy      Social History:  She reports that she has been smoking cigarettes. She has been smoking an average of .5 packs per day. She has been exposed to tobacco smoke. She has never used smokeless tobacco. She reports current alcohol use. She reports that she does not use drugs.  She has not worked for the past year she was a     Family History:  Family History   Problem Relation Name Age of Onset    Diabetes Mother      Hypertension Mother      Diabetes Sister      Diabetes Brother          Allergies:  Atorvastatin    Inpatient Medications:  Scheduled medications   Medication Dose Route Frequency    aspirin  325 mg oral Once    clopidogrel  75 mg oral Daily    cyanocobalamin  1,000 mcg oral Daily    enoxaparin  40 mg subcutaneous Daily    FLUoxetine  20 mg oral q AM    fluticasone furoate-vilanteroL  1 puff inhalation Daily    lisinopril 20 mg, hydroCHLOROthiazide 25 mg for Zestoretic/Prinizide   oral Daily    nicotine  " 1 patch transdermal Daily    pantoprazole  40 mg oral Daily before breakfast    pravastatin  40 mg oral Nightly     PRN medications   Medication    acetaminophen    Or    acetaminophen    Or    acetaminophen    albuterol    benzocaine-menthol    dextromethorphan-guaifenesin    guaiFENesin    ondansetron ODT    Or    ondansetron    polyethylene glycol     Continuous Medications   Medication Dose Last Rate     Outpatient Medications:  Current Outpatient Medications   Medication Instructions    acetaminophen (TYLENOL) 650 mg, oral, Every 6 hours PRN    albuterol 90 mcg/actuation inhaler 2 puffs, inhalation, Every 4 hours PRN, Use with spacer    aspirin-acetaminophen-caffeine (Excedrin Extra Strength) 250-250-65 mg tablet 1 tablet, oral, 1-2 times per week    atorvastatin (LIPITOR) 40 mg, oral, Daily    clopidogrel (PLAVIX) 75 mg, oral, Daily    coenzyme Q-10 100 mg, oral, Daily, With a meal    cyanocobalamin (Vitamin B-12) 1,000 mcg tablet 1 tablet, oral, Daily    FLUoxetine (PROZAC) 20 mg, oral, Every morning    fluticasone furoate-vilanteroL (Breo Ellipta) 200-25 mcg/dose inhaler 1 puff, inhalation, Daily    ibuprofen 600 mg, oral, Every 6 hours PRN    lisinopriL-hydrochlorothiazide 20-25 mg tablet 1 tablet, oral, Daily    multivit-min/vit C/herb no.124 (AIRBORNE, ASCORBIC ACID, ORAL) Orally    omeprazole OTC (PRILOSEC OTC) 20 mg, oral, Daily    pravastatin (PRAVACHOL) 40 mg, oral, Daily       Physical Exam:  General well-developed well-nourished white female no acute distress  Head normocephalic  Neck shows no JVD  Lungs are clear  Cardiovascular PMI is nonpalpable S1 is normal to a single there is no S3 murmur appreciated pressure over her left breast brings out similar type pains.  Abdominal exam is soft   and rectal deferred  Extremities show no pitting edema.     Assessment/Plan   1 chest pain  2 hypertension  3 smoking history  4 remote TIA  5 hypercholesterolemia    This is a 63-year-old white female comes  in with rather atypical chest discomforts.  Pressure or chest brings out similar type pains.  EKG is unremarkable as well as the troponin being 7.  Lesion not exertionally related more with just sitting.  Feel it may be more neuromuscular would like to do a stress test for completeness and we will arrange this for today.  If this were negative she could be discharged    Peripheral IV 02/26/24 Left Antecubital (Active)   Site Assessment Clean;Dry;Intact 02/27/24 0000   Dressing Type Transparent 02/27/24 0000   Line Status Blood return noted;Flushed 02/27/24 0000   Dressing Status Clean;Dry;Occlusive 02/27/24 0000   Number of days: 1       Code Status:  Full Code    I spent 30 minutes in the professional and overall care of this patient.        Keven Leal MD

## 2024-02-27 NOTE — DISCHARGE SUMMARY
Discharge Diagnosis  Chest pain    Issues Requiring Follow-Up  Primary care follow-up    Discharge Meds     Your medication list        CONTINUE taking these medications        Instructions Last Dose Given Next Dose Due   acetaminophen 325 mg tablet  Commonly known as: Tylenol           AIRBORNE (ASCORBIC ACID) ORAL           albuterol 90 mcg/actuation inhaler      Inhale 2 puffs every 4 hours if needed for wheezing or shortness of breath (cough). Use with spacer       atorvastatin 40 mg tablet  Commonly known as: Lipitor           clopidogrel 75 mg tablet  Commonly known as: Plavix      TAKE 1 TABLET BY MOUTH EVERY DAY       coenzyme Q-10 100 mg capsule           cyanocobalamin 1,000 mcg tablet  Commonly known as: Vitamin B-12           Excedrin Extra Strength 250-250-65 mg tablet  Generic drug: aspirin-acetaminophen-caffeine           FLUoxetine 20 mg capsule  Commonly known as: PROzac           fluticasone furoate-vilanteroL 200-25 mcg/dose inhaler  Commonly known as: Breo Ellipta      Inhale 1 puff once daily.       ibuprofen 600 mg tablet           lisinopriL-hydrochlorothiazide 20-25 mg tablet      Take 0.5 tablets by mouth once daily.       pravastatin 40 mg tablet  Commonly known as: Pravachol           PriLOSEC OTC 20 mg EC tablet  Generic drug: omeprazole OTC                     Where to Get Your Medications        Information about where to get these medications is not yet available    Ask your nurse or doctor about these medications  lisinopriL-hydrochlorothiazide 20-25 mg tablet         Test Results Pending At Discharge  Pending Labs       No current pending labs.            Hospital Course   Patient admitted to the hospital primary complaint of chest discomfort.  Evaluation done in the emergency department did not reveal acute myocardial infarction.  Concern for anginal symptoms.  Patient admitted for rule out MI and cardiac evaluation.  Cardiology saw patient this morning patient had stress test which  was normal.  Patient will be discharged home with follow-up with her primary care physician    Of note patient did complain of some tingling along her left side of cheek which has been on and off.  Neuroexam was unremarkable and patient has not had any other concerning symptoms of TIA/stroke.  Patient with history of TIA in past.    Patient will be discharged home in stable condition and will follow-up with primary care physician.  If patient has any further neurologic complaints she should return to the emergency department.  Patient understands and is comfortable being discharged home today.    Pertinent Physical Exam At Time of Discharge  Physical Exam  Generally no acute distress  HEENT PERRL EOMI  Cardiovascular S1-S2 regular rate rhythm lungs clear to auscultation bilaterally  Abdomen nontender sounds present  Extremities are clubbing sinus edema  Neuro alert and oriented x 3, cranial nerves II through XII are intact both from a sensation standpoint and motor standpoint.  Upper and lower extremity strength 5 out of 5, sensation intact.  Outpatient Follow-Up  Future Appointments   Date Time Provider Department Center   8/16/2024 10:00 AM Morteza Elias MD [id number]     Total time spent discharge was 40 minutes    Dillon Grant MD

## 2024-02-27 NOTE — ED PROVIDER NOTES
EMERGENCY DEPARTMENT ENCOUNTER      [ ] CODE STEMI [ ] CODE Neuro [ ] CODE Yellow [ ] Modified Trauma [ ] CODE Blue      CHIEF COMPLAINT      Chief Complaint   Patient presents with    Chest Pain     Patient started having left sided chest pain for the last couple days. Patient states she is having left sided facial tingling that is new. Per patient she is on plavix from a TIA 1.5 yrs ago. Patient does have pain upon palpation to the left breast.        Mode of Arrival:Ambulance  Primary Care Provider: Morteza Elias MD  Medical Record Number: [id number]      History obtained by: Patient  Limited by nothing  Time seen: 10:22 PM    QUALITY MEASURES   PPE Utilized: N95 with goggles and gloves      HPI      Dolores Martinez is a 63 y.o. female with a history of smoking, GERD, anxiety, high cholesterol, prior TIA, no permanent deficits, states that she has had intermittent left-sided chest pain for the last couple days multiple times a day, lasting for few minutes, not brought on by exertion, not more painful breath, and states that she has occasional lightheadedness with it.  That noticed that she also gets some left-sided facial tingling that occurs.  States that she is on Plavix since a year and have ago and she had a TIA.  Does note that it is reproducible to touch but did not notice any skin changes.  Denies any palpitations diaphoresis or actual syncope.  Denies any jane shortness of breath.  Denies any recent travel or sick contacts.  Denies history of stress test.  No other complaints.      Patient otherwise denies fever, chills, n/v/d,  shortness of breath, sore throat, cough, rhinorrhea, abdominal pain, dysuria, hematuria, swollen extremities or skin changes, hematemesis, hematochezia, melena or any other accompanying symptoms of late.      The patient has no other complaints at this time.               PAST MEDICAL HISTORY    Past Medical History:   Diagnosis Date    Abscess, dental 09/13/2023     Gastroesophageal reflux disease 09/13/2023    Impaired fasting glucose 09/13/2023    Injury of foot 09/13/2023    Mixed anxiety and depressive disorder 09/13/2023    Pure hypercholesterolemia 09/13/2023    Tension headache 09/13/2023         I have personally reviewed the patient's past medical history in the records.  Ashok Goins MD    SURGICAL HISTORY    Past Surgical History:   Procedure Laterality Date    CT HEAD ANGIO W AND WO IV CONTRAST  7/23/2022    CT HEAD ANGIO W AND WO IV CONTRAST [facility]    MR HEAD ANGIO WO IV CONTRAST  7/23/2022    MR HEAD ANGIO WO IV CONTRAST [facility]       I have personally reviewed the patient's past surgical history in the records.  Ashok Goins MD    CURRENT MEDICATIONS    I have reviewed the patient’s medications.   Please see nursing and pharmacy records for the most up to date list.     [unfilled]    ALLERGIES    No Known Allergies    I have personally reviewed the patient's past history of allergies in the records.  Ashok Goins MD    FAMILY HISTORY    Family History   Problem Relation Name Age of Onset    Diabetes Mother      Hypertension Mother      Diabetes Sister      Diabetes Brother         I have personally reviewed the patient's family history in the records.  Ashok Goins MD    SOCIAL HISTORY    Social History     Socioeconomic History    Marital status:      Spouse name: Not on file    Number of children: Not on file    Years of education: Not on file    Highest education level: Not on file   Occupational History    Not on file   Tobacco Use    Smoking status: Every Day     Packs/day: .5     Types: Cigarettes     Passive exposure: Current    Smokeless tobacco: Never   Vaping Use    Vaping Use: Never used   Substance and Sexual Activity    Alcohol use: Yes    Drug use: Never    Sexual activity: Not on file   Other Topics Concern    Not on file   Social History Narrative    Not on file     Social Determinants of Health     Financial Resource  "Strain: Not on file   Food Insecurity: Not on file   Transportation Needs: Not on file   Physical Activity: Not on file   Stress: Not on file   Social Connections: Not on file   Intimate Partner Violence: Not on file   Housing Stability: Not on file         I have personally reviewed the patient's social history in the records.  Ashok Goins MD    REVIEW OF SYSTEMS      14 point ROS was reviewed and negative except as noted above in HPI.      PHYSICAL EXAM    VITAL SIGNS:    /66   Pulse 83   Temp 36.8 °C (98.2 °F) (Oral)   Resp 17   Ht 1.549 m (5' 1\")   Wt 55.2 kg (121 lb 11.1 oz)   SpO2 96%   BMI 22.99 kg/m²    Review EMR for vital signs  Nursing note and vitals reviewed.    Constitutional:  Alert and oriented, well-developed, well-nourished, appears stated age, non-toxic appearing  HENT:  Normocephalic, atraumatic, bilateral external ears normal, oropharynx moist, Nose normal.  Neck: normal range of motion, no tenderness, supple, no stridor.  Eyes:  PERRL, EOMI, conjunctiva normal, no discharge.   Cardiovascular:  Normal heart rate, normal rhythm, no murmurs, no rubs, no gallops.   Respiratory:  Normal breath sounds, no respiratory distress, no wheezing, left anterior lateral chest wall tenderness with slight production to touch  GI:  Bowel sounds normal, soft, no tenderness, no rebound or guarding, no distention, no masses pulsatile or otherwise   (any female  exam was done with female chaperone present):   Deferred  Integument:  Warm, dry, no erythema, no rash, no edema.   Back:  No midline tenderness, no CVA tenderness.   Musculoskeletal:  Intact distal pulses, no tenderness, no cyanosis, no clubbing, with capillary refill less than 2 seconds. Good range of motion in all major joints. No tenderness to palpation or major deformities noted.    Neurologic:  Alert & oriented x 3, normal motor function, normal sensory function, no focal deficits noted. Cranial nerves II-XII intact.  Subjective left " facial tingling noted on exam  Psychiatric:  Affect normal, judgment normal, mood normal.     EKG  Performed at   2001 , HR of  77   ,     NSR, RAD, no sign of STEMI or NSTEMI, no Q wave or T wave abnormality noted.    Reviewed and interpreted by me at time performed      Reviewed and interpreted by me, Ashok Goins MD        CARDIAC MONITORING    Cardiac monitor reveals normal sinus rhythm as interpreted by me.   Cardiac monitor was ordered secondary to patient's history of chest pain/palpitations/near-syncope/syncope/sob/stroke and to monitor the patient for dysrhythmia.      RADIOLOGY  XR chest 1 view   Final Result   No acute pulmonary abnormality.   Signed by Virgil Carpio MD      CT head wo IV contrast    (Results Pending)       All Imaging studies evaluated and interpreted by ED physician except when noted otherwise.    ED PROVIDER INTERPRETATION (XRAYS ONLY):       *I have interpreted the x-ray real-time in the ED myself, and made a clinical decision on it prior to the formal radiology reading.    Ashok Goins M.D.    RADIOLOGIST IMPRESSION (U/S, CT, MRI):   XR chest 1 view   Final Result   No acute pulmonary abnormality.   Signed by Virgil Carpio MD      CT head wo IV contrast    (Results Pending)         PERTINENT LABS    Please refer to the chart for all lab work and to MDM for relevant discussion.      PROCEDURE    None (procdoc)    ED COURSE & MEDICAL DECISION MAKING    Pertinent Labs & Imaging studies reviewed. (See chart for details)    HEART SCORE For Chest Pain Risk  HEART SCORE:  Risk stratification scoring that has been prospectively and retrospectively validated for risk stratifying patients at risk for coronary artery disease and the rate of Major Adverse Cardiac Events (MACE) defined as myocardial infarction, positive catheterization or intervention, CABG, or Death.     Category Item Points Patient Score   History Highly Suspicious 2 0    Moderately Suspicious 1     Slightly Suspicious 0     ECG Significant ST-deviation 2 0    Nonspecific repolarization  1     Normal 0    Age >65 Years Old 2 1    >45 and <65 Years Old 1     <45 years old 0    Risk Factors >=3 risk factors or history or known disease 2 2    1 or 2 risk factors 1     No risk factors known 0    Troponin >= 3 times the normal limit 2 0    >1 and <3 times the normal limit 1     <=1 times the normal limit 0    Total Score: 3     Risk Factors for Atherosclerotic Disease: Hypercholesterolemia, Smoking, Hypertension, Diabetes, Positive Family History, Obesity    Score Interpretation:   The initial study classified three categories of risk:  0-3 points = 2.5% MACE over next 6 weeks, consider close outpatient workup  4-6 points = 20.3% MACE over next 6 weeks, consider inpatient observation for testing  7-10 points = 72.7% MACE over next 6 weeks, consider early invasive strategies    Patient Interpretation:  This patient is considered lowRisk based upon their heart score.      MDM:    Assessment: Dolores Martinez is a 63 y.o.female who presents to the ED with reproducible chest wall pain but some risk factors for ACS does not appear to have prior stress testing so did tell patient for now we will get CBC chemistry delta troponin and chest x-ray will also do CT brain given patient's left facial tingling.          Prior records in EPIC reviewed by me.     2023 Coding Requirements:  --Independent historian(s):    see HPI  --Review of prior records:    EHR reviewed   --Relevant comorbidities:    see records  --Social determinants of health:          CHEST PAIN I have considered the following conditions in my assessment of   this patient's condition:  Arm pain, chest pain, aortic dissection, cholecystitis,   coronary syndrome acute, pericarditis, myocarditis, tamponade, esophageal   rupture, herpes zoster or shingles, myocardial infarction acute, pneumonia,   pneumothorax, pulmonary embolus, chest wall pain, costochondritis.    CBC chemistry EKG and chest  "x-ray do not show any acute findings      CT head within normal limits.  Dr. Pettit agreed to accept patient for stress testing        ED VITALS  Vitals:    02/26/24 2010 02/26/24 2045   BP: 122/72 123/66   BP Location: Left arm    Pulse: 77 83   Resp: 15 17   Temp: 36.8 °C (98.2 °F)    TempSrc: Oral    SpO2: 96% 96%   Weight: 55.2 kg (121 lb 11.1 oz)    Height: 1.549 m (5' 1\")        BP  Min: 122/72  Max: 123/66    As part of the 2022 [facility] reporting requirements, the following measures have been reviewed and documented:    None     1. Chest wall pain    2. Numbness and tingling of left side of face        Diagnoses as of 02/26/24 2222   Chest wall pain   Numbness and tingling of left side of face         DISPOSITION       Hospital Admission or Observation    This Patient will be admitted to the Inpatient Team under the care of admitting physician,      , who requests that the patient be placed on:      [x]  Observation []  Inpatient status.    The clinical presentation, vital signs, ED course and treatment, and the results of imaging and laboratory tests were discussed with the admitting physician.  Transition orders were written to provide for patient safety and immediate medical needs.  Proper admitting orders will be provided by the admitting physician. The patient will be transferred to the appropriate inpatient destination when the bed is available.      I discussed the results of ED evaluation and treatment in detail with the patient and attending family members.  Plan for further management were discussed and questions answered.  The patient agrees to the treatment plan.      Condition on admission:    [x]  Stable    []  Improved  []  Guarded    []  Worsened  []  Critical           Electronically signed by MD Ashok Melton MD  02/27/24 7466    "

## 2024-02-27 NOTE — H&P
History Of Present Illness      Dolores Martinez is a 63 y.o. female presenting with Chest Pain.       Dolores Martinez is a 63 y.o. female with a history of smoking, GERD, anxiety, high cholesterol, prior TIA, no permanent deficits, states that she has had intermittent left-sided chest pain for the last couple days multiple times a day, lasting for few minutes, not brought on by exertion, not more painful breath, and states that she has occasional lightheadedness with it.  That noticed that she also gets some left-sided facial tingling that occurs.  States that she is on Plavix since a year and have ago and she had a TIA.  Does note that it is reproducible to touch but did not notice any skin changes.  Denies any palpitations diaphoresis or actual syncope.  Denies any jane shortness of breath.  Denies any recent travel or sick contacts.  Denies history of stress test.  No other complaints.         The patient's ED diagnostic workup was noted for a unremarkable CBC with differential.  Patient's blood chemistry noted for a low normal bicarbonate of 23.  Glucose was minimally elevated to 104.  Otherwise the BUN was 27.  The patient's first set troponin was negative.  X-ray was read as negative for any acute cardiopulmonary process. A CT of the brain was negative for any acute intracranial abnormality.      EKG in the ED:   HR of  77   ,  NSR, RAD, no sign of STEMI or NSTEMI, no Q wave or T wave abnormality noted.       Off note the patient underwent a 2D echocardiogram on July 23, 2022.  It was noted for an LV chamber size that was normal.  There was normal left ventricular systolic function.  Estimated ejection fraction was 60%.  Abnormal relaxation filling pattern of the left ventricle for age.  Stage I diastolic dysfunction.  No abnormalities visualized in the right ventricle.  No evidence of aortic regurgitation.  Mild mitral regurgitation.      Past Medical History      Past Medical History:   Diagnosis Date    Abscess,  dental 09/13/2023    Gastroesophageal reflux disease 09/13/2023    Impaired fasting glucose 09/13/2023    Injury of foot 09/13/2023    Mixed anxiety and depressive disorder 09/13/2023    Pure hypercholesterolemia 09/13/2023    Tension headache 09/13/2023         Surgical History        Past Surgical History:   Procedure Laterality Date    CT HEAD ANGIO W AND WO IV CONTRAST  7/23/2022    CT HEAD ANGIO W AND WO IV CONTRAST [facility]    MR HEAD ANGIO WO IV CONTRAST  7/23/2022    MR HEAD ANGIO WO IV CONTRAST [facility]          Social History    She reports that she has been smoking cigarettes. She has been smoking an average of .5 packs per day. She has been exposed to tobacco smoke. She has never used smokeless tobacco. She reports current alcohol use. She reports that she does not use drugs.      Family History      Family History   Problem Relation Name Age of Onset    Diabetes Mother      Hypertension Mother      Diabetes Sister      Diabetes Brother            Allergies      Patient has no known allergies.        Review of Systems    14-point ROS otherwise negative, as per HPI/Interval History.    General: No change in weight. No weakenss. No Fevers/Chills/Night Sweats   Skin: No skin/hair/nail changes. No rashes or sores.  Head:  No trauma. No Headache/nasuea/vomitting.   Eyes: No visual changes. No tearing. No itching.   Ears: No hearing loss. No tinnitus. No vertigo. No discharge.  Nose, Sinuses: No rhinorrhea, No nasal congestion. No epistaxis.  Mouth, Throat, Neck: No bleeding gums, hoarseness, sore throat or swollen neck  Cardiac: No palpitations. No RAM. No PND. No Orthopnea.   Respiratory: No Shortness of Breath. No wheezing. No cough. No hemoptysis.   GI: No nausea/vomiting. No indigestion. No diarrhea. No constipation.   Extremities: No numbness or tingling. No paresthesias.   Urinary: No change in urinary frequency. No change in hesitancy. No hematuria. No incontinence.  Neuro: :  "Left cheek tingling          Physical Exam        Constitutional:  Pleasant  Eyes: PERRL, EOMI,   ENMT: mucous membranes moist  Head/Neck: Neck supple, No JVD,   Respiratory/Thorax: Patent airways, CTAB,   Cardiovascular: Regular, rate and rhythm, no murmurs  Gastrointestinal: Soft, non-distended, +BS.  Musculoskeletal: ROM intact, no joint swelling, normal strength  Extremities: peripheral pulses intact; no edema  Neurological: Alert and Oriented x 3; no focal deficits; gross motor and sensation intact; CN II-XII intact. No asterixis.  Psychological: Appropriate mood and behavior  Skin: No lesions, No rashes.         Last Recorded Vitals  Blood pressure 113/56, pulse 77, temperature 36.8 °C (98.2 °F), temperature source Oral, resp. rate (!) 22, height 1.549 m (5' 1\"), weight 55.2 kg (121 lb 11.1 oz), SpO2 94 %.    Relevant Results    Lab Results   Component Value Date    WBC 9.6 02/26/2024    HGB 12.7 02/26/2024    HCT 37.2 02/26/2024    MCV 88 02/26/2024     02/26/2024       Lab Results   Component Value Date    GLUCOSE 104 (H) 02/26/2024    CALCIUM 9.5 02/26/2024     02/26/2024    K 3.6 02/26/2024    CO2 23 (L) 02/26/2024     02/26/2024    BUN 27 (H) 02/26/2024    CREATININE 1.00 02/26/2024       Lab Results   Component Value Date    HGBA1C 5.8 12/13/2021         CT head wo IV contrast    Result Date: 2/26/2024  STUDY: CT Head without IV Contrast; 2/26/2024 at 10:04 PM INDICATION: Left facial tingling for three days. COMPARISON: CTA head 7/23/2022, CT head 7/22/2022. ACCESSION NUMBER(S): [id number] ORDERING CLINICIAN: PIERRE BROWNE TECHNIQUE: Noncontrast axial CT scan of head was performed.  Angled reformats in brain and bone windows were generated.  The images were reviewed in bone, brain, blood and soft tissue windows. Automated mA/kV exposure control was utilized and patient examination was performed in strict accordance with principles of ALARA. FINDINGS: CSF Spaces:  The ventricles, sulci " and basal cisterns are within normal limits for age.  There is no extraaxial fluid collection.   Parenchyma:  The grey-white differentiation is intact.  There is no mass effect or midline shift.  There is no intracranial hemorrhage. Mild cerebral volume loss with expansion of the extra-axial spaces in the frontal regions.  No focal areas of encephalomalacia.  No vasogenic edema is identified.  Calvarium:  The calvarium is unremarkable.  Paranasal sinuses and mastoids:  Visualized paranasal sinuses and mastoids are clear.    No CT evidence of acute intracranial abnormality. Although no evidence of acute infarction, mass, or hemorrhage is seen, CT is relatively insensitive for the detection of hypoxia/ischemia within the first 24-48 hours, and an MRI scan may be indicated. Signed by Rahat Huang, DO       Scheduled medications  aspirin, 325 mg, oral, Once  [START ON 2/27/2024] atorvastatin, 40 mg, oral, Daily  [START ON 2/27/2024] clopidogrel, 75 mg, oral, Daily  [START ON 2/27/2024] cyanocobalamin, 1,000 mcg, oral, Daily  enoxaparin, 40 mg, subcutaneous, q24h  [START ON 2/27/2024] FLUoxetine, 20 mg, oral, q AM  [START ON 2/27/2024] fluticasone furoate-vilanteroL, 1 puff, inhalation, Daily  [START ON 2/27/2024] lisinopril 20 mg, hydroCHLOROthiazide 25 mg for Zestoretic/Prinizide, , oral, Daily  [START ON 2/27/2024] pantoprazole, 40 mg, oral, Daily before breakfast      Continuous medications     PRN medications  PRN medications: acetaminophen **OR** acetaminophen **OR** acetaminophen, albuterol, benzocaine-menthol, dextromethorphan-guaifenesin, guaiFENesin, ondansetron **OR** ondansetron, polyethylene glycol        Assessment/Plan   Principal Problem:    Chest pain  Active Problems:    Amaurosis fugax    Hypertensive disorder    Gastroesophageal reflux disease    Paresthesia    Pure hypercholesterolemia    Tension headache    History of TIA (transient ischemic attack)          Dolores Martinez is a 63 y.o. female  presenting with Chest Pain.  There is associated left cheek numbness and tingling.  Patient admitted for further evaluation and management.          Chest Pain - Atypical versus Non-anginal       Admit patient to Telemetry service   Continuous Cardiac Monitor and BP Monitor Placement   Cardiology evaluation in AM.   Cardiac enzymes are negative (x 1 set) for acute MI.   Follow up second set CE (Tropinin + CK)   Monitor Electrolytes, Keep K+>4 + Mg++>2.   EKG reviewed  Will keep patient NPO   Continue Statin home dose       Left Cheek Tingling and Numbness     CT of the brain without contrast appreciated  Distribution appears to be related to trigeminal neuralgia  Patient loaded with high-dose aspirin on top of her home Plavix  Defer further management to neurology service  Possible differentials include neuropathy at this time      H/o TIA    Continue home Plavix therapy and Statin therapy      Dyslipidemia     Continue home statin therapy  Patient apparently had an intolerance to Lipitor prefers pravastatin      Tobacco Dependence       Smoking cessation counseling  Nicotine replacement therapy ordered      Depression    Continue home SSRI therapy      GI + DVT PPX    Continue home oral PPI therapy  Lovenox subcu          This Dictation was Transcribed using a Nuance Dragon Voice Recognition System Device (with Compatible Computer + Software) and as such may contain Grammatical Errors and Unintentional Typing Misprints.      I spent 31  minutes in the professional and overall care of this patient.      Ochoa Pettit MD

## 2024-02-27 NOTE — NURSING NOTE
"Discussed smoking cessation with patient. Pt. Smokes less than half a pack/day. Pt. Currently \"trying to\" quit.   "

## 2024-02-27 NOTE — CARE PLAN
Problem: Pain - Adult  Goal: Verbalizes/displays adequate comfort level or baseline comfort level  Outcome: Progressing     Problem: Safety - Adult  Goal: Free from fall injury  Outcome: Progressing     Problem: Discharge Planning  Goal: Discharge to home or other facility with appropriate resources  Outcome: Progressing     Problem: Chronic Conditions and Co-morbidities  Goal: Patient's chronic conditions and co-morbidity symptoms are monitored and maintained or improved  Outcome: Progressing     Problem: Fall/Injury  Goal: Not fall by end of shift  Outcome: Progressing  Goal: Be free from injury by end of the shift  Outcome: Progressing  Goal: Verbalize understanding of personal risk factors for fall in the hospital  Outcome: Progressing  Goal: Verbalize understanding of risk factor reduction measures to prevent injury from fall in the home  Outcome: Progressing  Goal: Use assistive devices by end of the shift  Outcome: Progressing  Goal: Pace activities to prevent fatigue by end of the shift  Outcome: Progressing     Problem: Pain  Goal: Takes deep breaths with improved pain control throughout the shift  Outcome: Progressing  Goal: Turns in bed with improved pain control throughout the shift  Outcome: Progressing  Goal: Walks with improved pain control throughout the shift  Outcome: Progressing  Goal: Performs ADL's with improved pain control throughout shift  Outcome: Progressing  Goal: Participates in PT with improved pain control throughout the shift  Outcome: Progressing  Goal: Free from opioid side effects throughout the shift  Outcome: Progressing  Goal: Free from acute confusion related to pain meds throughout the shift  Outcome: Progressing   The patient's goals for the shift include Safety, pain relief    The clinical goals for the shift include Maintain pt safety and comfort; monitor labs/vitals/telemetry    Over the shift, the patient did not make progress toward the following goals. Barriers to  progression include chest pain. Recommendations to address these barriers include close monitoring.

## 2024-02-27 NOTE — CARE PLAN
Problem: Pain - Adult  Goal: Verbalizes/displays adequate comfort level or baseline comfort level  2/27/2024 1537 by Elba Ghosh RN  Outcome: Adequate for Discharge  2/27/2024 1059 by Elba Ghosh RN  Outcome: Progressing     Problem: Safety - Adult  Goal: Free from fall injury  2/27/2024 1537 by Elba Ghosh RN  Outcome: Adequate for Discharge  2/27/2024 1059 by Elba Ghosh RN  Outcome: Progressing     Problem: Discharge Planning  Goal: Discharge to home or other facility with appropriate resources  2/27/2024 1537 by Elba Ghosh RN  Outcome: Adequate for Discharge  2/27/2024 1059 by Elba Ghosh RN  Outcome: Progressing     Problem: Chronic Conditions and Co-morbidities  Goal: Patient's chronic conditions and co-morbidity symptoms are monitored and maintained or improved  2/27/2024 1537 by Elba Ghosh RN  Outcome: Adequate for Discharge  2/27/2024 1059 by Elba Ghosh RN  Outcome: Progressing     Problem: Fall/Injury  Goal: Not fall by end of shift  2/27/2024 1537 by Elba Ghosh RN  Outcome: Adequate for Discharge  2/27/2024 1059 by Elba Ghosh RN  Outcome: Progressing  Goal: Be free from injury by end of the shift  2/27/2024 1537 by Elba Ghosh RN  Outcome: Adequate for Discharge  2/27/2024 1059 by Elba Ghosh RN  Outcome: Progressing  Goal: Verbalize understanding of personal risk factors for fall in the hospital  2/27/2024 1537 by Elba Ghosh RN  Outcome: Adequate for Discharge  2/27/2024 1059 by Elba Ghosh RN  Outcome: Progressing  Goal: Verbalize understanding of risk factor reduction measures to prevent injury from fall in the home  2/27/2024 1537 by Elba Ghosh RN  Outcome: Adequate for Discharge  2/27/2024 1059 by Elba Ghosh RN  Outcome: Progressing  Goal: Use assistive devices by end of the shift  2/27/2024 1537 by Elba Ghosh RN  Outcome: Adequate for Discharge  2/27/2024 1059 by Elba Ghosh RN  Outcome: Progressing  Goal: Pace activities to prevent  fatigue by end of the shift  2/27/2024 1537 by Elba Ghosh RN  Outcome: Adequate for Discharge  2/27/2024 1059 by Elba Ghosh RN  Outcome: Progressing     Problem: Pain  Goal: Takes deep breaths with improved pain control throughout the shift  2/27/2024 1537 by Elba Ghosh RN  Outcome: Adequate for Discharge  2/27/2024 1059 by Elba Ghosh RN  Outcome: Progressing  Goal: Turns in bed with improved pain control throughout the shift  2/27/2024 1537 by Elba Ghosh RN  Outcome: Adequate for Discharge  2/27/2024 1059 by Elba Ghosh RN  Outcome: Progressing  Goal: Walks with improved pain control throughout the shift  2/27/2024 1537 by Elba Ghosh RN  Outcome: Adequate for Discharge  2/27/2024 1059 by Elba Ghosh RN  Outcome: Progressing  Goal: Performs ADL's with improved pain control throughout shift  2/27/2024 1537 by Elba Ghosh RN  Outcome: Adequate for Discharge  2/27/2024 1059 by Elba Ghosh RN  Outcome: Progressing  Goal: Participates in PT with improved pain control throughout the shift  2/27/2024 1537 by Elba Ghosh RN  Outcome: Adequate for Discharge  2/27/2024 1059 by Elba Ghosh RN  Outcome: Progressing  Goal: Free from opioid side effects throughout the shift  2/27/2024 1537 by Elba Ghosh RN  Outcome: Adequate for Discharge  2/27/2024 1059 by Elba Ghosh RN  Outcome: Progressing  Goal: Free from acute confusion related to pain meds throughout the shift  2/27/2024 1537 by Elba Ghosh RN  Outcome: Adequate for Discharge  2/27/2024 1059 by Elba Ghosh RN  Outcome: Progressing   The patient's goals for the shift include Safety, pain relief    The clinical goals for the shift include free of chest pain    Over the shift, the patient did not make progress toward the following goals. Barriers to progression include. Recommendations to address these barriers include.

## 2024-02-27 NOTE — CONSULTS
[facility]  Inpatient Neurology Consultation    Date of Service: 2024, Hospital Day: 2     Inpatient consult to Neurology  Consult performed by: Rivera Peterson MD  Consult ordered by: Ochoa Pettit MD  Reason for consult: Left Cheek tingling     Impressions: Left cheek tingling - this happened during her TIA/left amaurosis fugax episode previously (2022) and was not symptomatic of the focal saccular aneurysm in the supraclinoid ophthalmic portion of the right ICA then either.     Recommendations/Plans: PCP follow up as deemed necessary. Outpatient [facility] Neurosurgery follow up of the aneursym is advised. No Outpatient Neurology Follow up is indicated.     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  She noted to the ED staff that she had had left facial tingling for the past 3 days. CT brain (, ) showed no evidence of acute intracranial abnormality.  Hemoglobin A1c (2024) was 5.7%.  A single dose of 325 mg aspirin was given mission. Lipid panel (2024) was as follows-cholesterol: 170; triglycerides: 203; LD; and HDL: 46. MRI/MRA brain is pending. She was last seen by Inpatient Neurology  (Ynes Hall MD) on 2022 who noted the loss of vision in her left eye lasted about 20 minutes during with blurry vision without diplopia and reported at that time a little bit of tingling on the left side of her face as well. MRI for ischemic change (2022).  However, MRA and CT angio found a focal saccular aneurysm in the supraclinoid ophthalmic portion of the right ICA  measuring 2 x 3 mm. She was seen placed on Plavix( clopidogrel) to prevent transient vision loss of left eye with Dr. 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 She was discharged home by the Hospitalist Service (Rudy) before I could answer Dr. Pettit's (admitting Hospitalist) Neurology Consultation.       Current Facility-Administered Medications:     acetaminophen (Tylenol) tablet 650 mg, 650 mg, oral, q4h PRN **OR** acetaminophen (Tylenol) oral liquid 650 mg, 650 mg, nasogastric tube, q4h PRN **OR** acetaminophen (Tylenol) suppository 650 mg, 650 mg, rectal, q4h PRN, Ochoa Pettit MD    albuterol 2.5 mg /3 mL (0.083 %) nebulizer solution 3 mL, 3 mL, nebulization, q4h PRN, Ochoa Pettit MD    aspirin tablet 325 mg, 325 mg, oral, Once, Ochoa Pettit MD    benzocaine-menthol (Cepastat Sore Throat) 15-3.6 mg lozenge 1 lozenge, 1 lozenge, Mouth/Throat, q2h PRN, Ochoa Pettit MD    clopidogrel (Plavix) tablet 75 mg, 75 mg, oral, Daily, Ochoa Pettit MD, 75 mg at 02/27/24 0829    cyanocobalamin (Vitamin B-12) tablet 1,000 mcg, 1,000 mcg, oral, Daily, Ochoa Pettit MD, 1,000 mcg at 02/27/24 0829    dextromethorphan-guaifenesin (Robitussin DM)  mg/5 mL oral liquid 5 mL, 5 mL, oral, q4h PRN, Ochoa Pettit MD    enoxaparin (Lovenox) syringe 40 mg, 40 mg, subcutaneous, Daily, Ochoa Pettit MD, 40 mg at 02/27/24 0612    FLUoxetine (PROzac) capsule 20 mg, 20 mg, oral, q AM, Ochoa Pettit MD, 20 mg at 02/27/24 0829    fluticasone furoate-vilanteroL (Breo Ellipta) 200-25 mcg/dose inhaler 1 puff, 1 puff, inhalation, Daily, Ochoa Pettit MD    guaiFENesin (Mucinex) 12 hr tablet 600 mg, 600 mg, oral, q12h PRN, Ochoa Pettit MD    lisinopril 20 mg, hydroCHLOROthiazide 25 mg for Zestoretic/Prinizide, , oral, Daily, Ochoa Pettit,  MD    nicotine (Nicoderm CQ) 14 mg/24 hr patch 1 patch, 1 patch, transdermal, Daily, Ochoa Pettit MD    ondansetron ODT (Zofran-ODT) disintegrating tablet 4 mg, 4 mg, oral, q8h PRN **OR** ondansetron (Zofran) injection 4 mg, 4 mg, intravenous, q8h PRN, Ochoa Pettit MD    pantoprazole (ProtoNix) EC tablet 40 mg, 40 mg, oral, Daily before breakfast, Ochoa Pettit MD, 40 mg at 02/27/24 0611    polyethylene glycol (Glycolax, Miralax) packet 17 g, 17 g, oral, Daily PRN, Ochoa Pettit MD    pravastatin (Pravachol) tablet 40 mg, 40 mg, oral, Nightly, Ochoa Pettit MD, 40 mg at 02/27/24 0114     Past Medical History:   Diagnosis Date    Abscess, dental 09/13/2023    Gastroesophageal reflux disease 09/13/2023    Impaired fasting glucose 09/13/2023    Injury of foot 09/13/2023    Mixed anxiety and depressive disorder 09/13/2023    Pure hypercholesterolemia 09/13/2023    Tension headache 09/13/2023     Past Surgical History:   Procedure Laterality Date    CT HEAD ANGIO W AND WO IV CONTRAST  7/23/2022    CT HEAD ANGIO W AND WO IV CONTRAST [facility]    MR HEAD ANGIO WO IV CONTRAST  7/23/2022    MR HEAD ANGIO WO IV CONTRAST [facility]     Social History     Tobacco Use    Smoking status: Every Day     Packs/day: .5     Types: Cigarettes     Passive exposure: Current    Smokeless tobacco: Never   Vaping Use    Vaping Use: Never used   Substance Use Topics    Alcohol use: Yes    Drug use: Never     Allergies  Atorvastatin    Medications Prior to Admission   Medication Sig Dispense Refill Last Dose    acetaminophen (Tylenol) 325 mg tablet Take 2 tablets (650 mg) by mouth every 6 hours if needed (pain).   Unknown    albuterol 90 mcg/actuation inhaler Inhale 2 puffs every 4 hours if needed for wheezing or shortness of breath (cough). Use with spacer 18 g 0 Unknown    aspirin-acetaminophen-caffeine (Excedrin Extra Strength) 250-250-65 mg tablet Take 1 tablet by mouth. 1-2 times per week   Unknown     "atorvastatin (Lipitor) 40 mg tablet Take 1 tablet (40 mg) by mouth once daily.   Unknown    clopidogrel (Plavix) 75 mg tablet TAKE 1 TABLET BY MOUTH EVERY DAY 90 tablet 2 Unknown    coenzyme Q-10 100 mg capsule Take 1 capsule (100 mg) by mouth once daily. With a meal   Unknown    cyanocobalamin (Vitamin B-12) 1,000 mcg tablet Take 1 tablet (1,000 mcg) by mouth once daily.   Unknown    FLUoxetine (PROzac) 20 mg capsule Take 1 capsule (20 mg) by mouth once daily in the morning.   Unknown    fluticasone furoate-vilanteroL (Breo Ellipta) 200-25 mcg/dose inhaler Inhale 1 puff once daily. 3 each 3 Unknown    ibuprofen 600 mg tablet Take 1 tablet (600 mg) by mouth every 6 hours if needed (pain).   Unknown    lisinopriL-hydrochlorothiazide 20-25 mg tablet TAKE 1 TABLET BY MOUTH EVERY DAY (Patient taking differently: Take 0.5 tablets by mouth once daily.) 90 tablet 2 Unknown    multivit-min/vit C/herb no.124 (AIRBORNE, ASCORBIC ACID, ORAL) Orally   Unknown    omeprazole OTC (PriLOSEC OTC) 20 mg EC tablet Take 1 tablet (20 mg) by mouth once daily.   Unknown    pravastatin (Pravachol) 40 mg tablet Take 1 tablet (40 mg) by mouth once daily.   Unknown       Review of Systems  Neurological Exam  Physical Exam  Last Recorded Vitals  Blood pressure 98/62, pulse 80, temperature 36.3 °C (97.3 °F), temperature source Temporal, resp. rate 16, height 1.549 m (5' 1\"), weight 59 kg (130 lb), SpO2 96 %.    Medical Decision Making:   CT head wo IV contrast (2/26/2024 at 10:04 PM):  No evidence of acute intracranial abnormality. I personally reviewed these images and concur with the radiological interpretation.      Past brain and cerebrovascular imaging review in the HPI above. I personally reviewed these images and concur with the radiological interpretation.      Results for orders placed or performed during the hospital encounter of 02/26/24 (from the past 24 hour(s))   CBC with Differential   Result Value Ref Range    WBC 9.6 4.4 - 11.3 " x10*3/uL    nRBC 0.0 0.0 - 0.0 /100 WBCs    RBC 4.21 4.00 - 5.20 x10*6/uL    Hemoglobin 12.7 12.0 - 16.0 g/dL    Hematocrit 37.2 36.0 - 46.0 %    MCV 88 80 - 100 fL    MCH 30.2 26.0 - 34.0 pg    MCHC 34.1 32.0 - 36.0 g/dL    RDW 13.3 11.5 - 14.5 %    Platelets 259 150 - 450 x10*3/uL    Neutrophils % 67.5 40.0 - 80.0 %    Immature Granulocytes %, Automated 0.2 0.0 - 0.9 %    Lymphocytes % 23.6 13.0 - 44.0 %    Monocytes % 6.3 2.0 - 10.0 %    Eosinophils % 2.0 0.0 - 6.0 %    Basophils % 0.4 0.0 - 2.0 %    Neutrophils Absolute 6.50 1.20 - 7.70 x10*3/uL    Immature Granulocytes Absolute, Automated 0.02 0.00 - 0.70 x10*3/uL    Lymphocytes Absolute 2.27 1.20 - 4.80 x10*3/uL    Monocytes Absolute 0.61 0.10 - 1.00 x10*3/uL    Eosinophils Absolute 0.19 0.00 - 0.70 x10*3/uL    Basophils Absolute 0.04 0.00 - 0.10 x10*3/uL   Comprehensive Metabolic Panel   Result Value Ref Range    Glucose 104 (H) 65 - 99 mg/dL    Sodium 137 133 - 145 mmol/L    Potassium 3.6 3.4 - 5.1 mmol/L    Chloride 102 97 - 107 mmol/L    Bicarbonate 23 (L) 24 - 31 mmol/L    Urea Nitrogen 27 (H) 8 - 25 mg/dL    Creatinine 1.00 0.40 - 1.60 mg/dL    eGFR 63 >60 mL/min/1.73m*2    Calcium 9.5 8.5 - 10.4 mg/dL    Albumin 4.4 3.5 - 5.0 g/dL    Alkaline Phosphatase 67 35 - 125 U/L    Total Protein 7.5 5.9 - 7.9 g/dL    AST 11 5 - 40 U/L    Bilirubin, Total 0.2 0.1 - 1.2 mg/dL    ALT 12 5 - 40 U/L    Anion Gap 12 <=19 mmol/L   Troponin T, High Sensitivity   Result Value Ref Range    Troponin T, High Sensitivity 7 <=14 ng/L   CBC   Result Value Ref Range    WBC 6.9 4.4 - 11.3 x10*3/uL    nRBC 0.0 0.0 - 0.0 /100 WBCs    RBC 4.24 4.00 - 5.20 x10*6/uL    Hemoglobin 12.5 12.0 - 16.0 g/dL    Hematocrit 37.6 36.0 - 46.0 %    MCV 89 80 - 100 fL    MCH 29.5 26.0 - 34.0 pg    MCHC 33.2 32.0 - 36.0 g/dL    RDW 13.2 11.5 - 14.5 %    Platelets 239 150 - 450 x10*3/uL   Basic metabolic panel   Result Value Ref Range    Glucose 90 65 - 99 mg/dL    Sodium 139 133 - 145 mmol/L     Potassium 3.6 3.4 - 5.1 mmol/L    Chloride 105 97 - 107 mmol/L    Bicarbonate 24 24 - 31 mmol/L    Urea Nitrogen 22 8 - 25 mg/dL    Creatinine 0.70 0.40 - 1.60 mg/dL    eGFR >90 >60 mL/min/1.73m*2    Calcium 9.2 8.5 - 10.4 mg/dL    Anion Gap 10 <=19 mmol/L   Hemoglobin A1c   Result Value Ref Range    Hemoglobin A1C 5.7 (H) See below %    Estimated Average Glucose 117 Not Established mg/dL   Lipid panel   Result Value Ref Range    Cholesterol 170 133 - 200 mg/dL    HDL-Cholesterol 46.0 (L) >50.0 mg/dL    Cholesterol/HDL Ratio 3.7 SEE COMMENT    LDL Calculated 83 65 - 130 mg/dL    Triglycerides 203 (H) 40 - 150 mg/dL     I personally spent 32 minutes (pre-visit review: 8:33 AM to 9:05 AM) while at [facility] reviewing and documenting this chart in preparation for seeing her but she was discharged before she could be seen.     Rivera Peterson MD  [facility] Neurohospitalist  (contact by secure message preferred)

## 2024-02-28 ENCOUNTER — PATIENT OUTREACH (OUTPATIENT)
Dept: PRIMARY CARE | Facility: CLINIC | Age: 64
End: 2024-02-28

## 2024-02-28 ENCOUNTER — DOCUMENTATION (OUTPATIENT)
Dept: PRIMARY CARE | Facility: CLINIC | Age: 64
End: 2024-02-28

## 2024-02-28 NOTE — PROGRESS NOTES
Discharge Facility: [facility]     Discharge Diagnosis:    Chest pain     Issues Requiring Follow-Up  Primary care follow-up     Admission Date: 2/26/2024  Discharge Date:  2/27/2024     PCP Appointment Date: 3/5/2024     Specialist Appointment Date: NA    Hospital Encounter and Summary: Linked     Call attempt X2

## 2024-03-04 PROBLEM — H53.9 VISION DISORDER: Status: ACTIVE | Noted: 2023-09-13

## 2024-03-04 PROBLEM — M79.669 CALF PAIN: Status: ACTIVE | Noted: 2024-03-04

## 2024-03-04 PROBLEM — R53.1 WEAKNESS: Status: ACTIVE | Noted: 2024-03-04

## 2024-03-04 PROBLEM — J41.1 MUCOPURULENT CHRONIC BRONCHITIS (MULTI): Status: ACTIVE | Noted: 2024-03-04

## 2024-03-04 PROBLEM — S89.90XA INJURY OF LOWER EXTREMITY: Status: ACTIVE | Noted: 2024-03-04

## 2024-03-04 PROBLEM — Z86.73 HISTORY OF TRANSIENT ISCHEMIC ATTACK: Status: ACTIVE | Noted: 2022-12-23

## 2024-03-04 PROBLEM — S93.409A SPRAIN OF ANKLE: Status: ACTIVE | Noted: 2024-03-04

## 2024-03-04 PROBLEM — R51.9 HEADACHE: Status: ACTIVE | Noted: 2024-03-04

## 2024-03-04 PROBLEM — G45.9 TRANSIENT ISCHEMIC ATTACK: Status: ACTIVE | Noted: 2024-03-04

## 2024-03-05 ENCOUNTER — OFFICE VISIT (OUTPATIENT)
Dept: PRIMARY CARE | Facility: CLINIC | Age: 64
End: 2024-03-05

## 2024-03-05 VITALS
WEIGHT: 120 LBS | HEART RATE: 96 BPM | HEIGHT: 61 IN | TEMPERATURE: 97.6 F | OXYGEN SATURATION: 98 % | BODY MASS INDEX: 22.66 KG/M2 | DIASTOLIC BLOOD PRESSURE: 64 MMHG | SYSTOLIC BLOOD PRESSURE: 105 MMHG

## 2024-03-05 DIAGNOSIS — E34.8 ESTRADIOL DEFICIENCY: ICD-10-CM

## 2024-03-05 DIAGNOSIS — R73.01 IMPAIRED FASTING GLUCOSE: ICD-10-CM

## 2024-03-05 DIAGNOSIS — I10 ESSENTIAL HYPERTENSION: ICD-10-CM

## 2024-03-05 DIAGNOSIS — E55.9 VITAMIN D INSUFFICIENCY: ICD-10-CM

## 2024-03-05 DIAGNOSIS — K21.9 GASTROESOPHAGEAL REFLUX DISEASE WITHOUT ESOPHAGITIS: ICD-10-CM

## 2024-03-05 DIAGNOSIS — Z12.31 VISIT FOR SCREENING MAMMOGRAM: ICD-10-CM

## 2024-03-05 DIAGNOSIS — E78.00 HYPERCHOLESTEROLEMIA: ICD-10-CM

## 2024-03-05 DIAGNOSIS — H53.9 VISUAL DISTURBANCE: Primary | ICD-10-CM

## 2024-03-05 PROCEDURE — 99212 OFFICE O/P EST SF 10 MIN: CPT | Performed by: INTERNAL MEDICINE

## 2024-03-05 PROCEDURE — 3078F DIAST BP <80 MM HG: CPT | Performed by: INTERNAL MEDICINE

## 2024-03-05 PROCEDURE — 3074F SYST BP LT 130 MM HG: CPT | Performed by: INTERNAL MEDICINE

## 2024-03-05 RX ORDER — PANTOPRAZOLE SODIUM 40 MG/1
40 TABLET, DELAYED RELEASE ORAL DAILY
Qty: 30 TABLET | Refills: 4 | Status: SHIPPED | OUTPATIENT
Start: 2024-03-05 | End: 2024-03-05

## 2024-03-05 RX ORDER — PANTOPRAZOLE SODIUM 40 MG/1
TABLET, DELAYED RELEASE ORAL
Qty: 90 TABLET | Refills: 1 | Status: SHIPPED | OUTPATIENT
Start: 2024-03-05

## 2024-03-05 ASSESSMENT — PATIENT HEALTH QUESTIONNAIRE - PHQ9
SUM OF ALL RESPONSES TO PHQ9 QUESTIONS 1 AND 2: 0
1. LITTLE INTEREST OR PLEASURE IN DOING THINGS: NOT AT ALL
2. FEELING DOWN, DEPRESSED OR HOPELESS: NOT AT ALL

## 2024-03-05 ASSESSMENT — PAIN SCALES - GENERAL: PAINLEVEL: 0-NO PAIN

## 2024-03-05 ASSESSMENT — ENCOUNTER SYMPTOMS
PALPITATIONS: 0
SHORTNESS OF BREATH: 0

## 2024-03-05 NOTE — PATIENT INSTRUCTIONS
October wellness. Labs 1 week before apt. Bring in [facility], [facility] health papers in October.  Call [phone] in September to schedule Mammogram, DEXA bone density on same day for October.  Diagnoses and all orders for this visit:  Visual disturbance  Comments:  Stress test negative, declines lung CA screenig, risk factor control, no change in meds, work on getting off all tobacco.  Essential hypertension  Comments:  BP doing OK.  Hypercholesterolemia  Comments:  LDL 83 on pravastatin treatment.  Orders:  -     Comprehensive Metabolic Panel; Future  -     Lipid Panel; Future  Impaired fasting glucose  -     CBC; Future  Gastroesophageal reflux disease without esophagitis  Comments:  change from omeprazole to  pantoprazole 40 mg daily  Orders:  -     pantoprazole (ProtoNix) 40 mg EC tablet; Take 1 tablet (40 mg) by mouth once daily. Do not crush, chew, or split.  Vitamin D insufficiency  -     Vitamin D 25-Hydroxy,Total (for eval of Vitamin D levels); Future

## 2024-03-05 NOTE — PROGRESS NOTES
"[facility]: [facility]  PROGRESS NOTE      Dolores Martinez is a 63 y.o. female that is presenting today for Follow-up (ER fu, dizziness continued).    Assessment/Plan   Diagnoses and all orders for this visit:  Visual disturbance  Comments:  Stress test negative, declines lung CA screenig, risk factor control, no change in meds, work on getting off all tobacco.  Essential hypertension  Comments:  BP doing OK.  Hypercholesterolemia  Comments:  LDL 83 on pravastatin treatment.  Orders:  -     Comprehensive Metabolic Panel; Future  -     Lipid Panel; Future  Impaired fasting glucose  -     CBC; Future  Gastroesophageal reflux disease without esophagitis  Comments:  change from omeprazole to  pantoprazole 40 mg daily  Orders:  -     pantoprazole (ProtoNix) 40 mg EC tablet; Take 1 tablet (40 mg) by mouth once daily. Do not crush, chew, or split.  Vitamin D insufficiency  -     Vitamin D 25-Hydroxy,Total (for eval of Vitamin D levels); Future    Subjective   Observation status 2/26- 2/27 \"Patient admitted to the hospital primary complaint of chest discomfort.  Evaluation done in the emergency department did not reveal acute myocardial infarction.  Concern for anginal symptoms.  Patient admitted for rule out MI and cardiac evaluation.  Cardiology saw patient this morning patient had stress test which was normal.  Patient will be discharged home with follow-up with her primary care physician     Of note patient did complain of some tingling along her left side of cheek which has been on and off.  Neuroexam was unremarkable and patient has not had any other concerning symptoms of TIA/stroke.  Patient with history of TIA in past.     Patient will be discharged home in stable condition and will follow-up with primary care physician.  If patient has any further neurologic complaints she should return to the emergency department.  Patient understands and is comfortable being discharged home today.\" CT brain " neg, isoenzymes, stress test neg.carotids neg 7/22. Darren Cruz seen for sacular nauerysm, Plavix R/O cardiac issues.         Review of Systems   Respiratory:  Negative for shortness of breath.    Cardiovascular:  Negative for chest pain and palpitations.   All other systems reviewed and are negative.     Objective   Vitals:    03/05/24 1421   BP: 105/64   Pulse: 96   Temp: 36.4 °C (97.6 °F)   SpO2: 98%      Body mass index is 22.67 kg/m².  Physical Exam  Constitutional:       General: She is not in acute distress.     Appearance: She is not toxic-appearing.   HENT:      Head: Normocephalic and atraumatic.      Right Ear: Tympanic membrane and ear canal normal.      Left Ear: Tympanic membrane and ear canal normal.      Nose: Nose normal.      Mouth/Throat:      Pharynx: Oropharynx is clear.   Eyes:      Extraocular Movements: Extraocular movements intact.      Pupils: Pupils are equal, round, and reactive to light.   Cardiovascular:      Rate and Rhythm: Normal rate and regular rhythm.      Heart sounds: Normal heart sounds. No murmur heard.  Pulmonary:      Breath sounds: Normal breath sounds.   Abdominal:      General: Bowel sounds are normal. There is no distension.      Palpations: Abdomen is soft. There is no mass.      Tenderness: There is no abdominal tenderness.   Musculoskeletal:         General: No swelling or tenderness.      Right lower leg: No edema.      Left lower leg: No edema.   Skin:     General: Skin is warm and dry.   Neurological:      General: No focal deficit present.      Mental Status: She is oriented to person, place, and time.      Sensory: No sensory deficit.      Motor: No weakness.      Deep Tendon Reflexes: Reflexes normal.     Diagnostic Results   Lab Results   Component Value Date    GLUCOSE 90 02/27/2024    CALCIUM 9.2 02/27/2024     02/27/2024    K 3.6 02/27/2024    CO2 24 02/27/2024     02/27/2024    BUN 22 02/27/2024    CREATININE 0.70 02/27/2024     Lab Results  "  Component Value Date    ALT 12 02/26/2024    AST 11 02/26/2024    ALKPHOS 67 02/26/2024    BILITOT 0.2 02/26/2024     Lab Results   Component Value Date    WBC 6.9 02/27/2024    HGB 12.5 02/27/2024    HCT 37.6 02/27/2024    MCV 89 02/27/2024     02/27/2024     Lab Results   Component Value Date    CHOL 170 02/27/2024    CHOL 169 07/23/2022    CHOL 174 06/22/2022     Lab Results   Component Value Date    HDL 46.0 (L) 02/27/2024    HDL 56 07/23/2022    HDL 51 06/22/2022     Lab Results   Component Value Date    LDLCALC 83 02/27/2024    LDLCALC 82 07/23/2022    LDLCALC 94 06/22/2022     Lab Results   Component Value Date    TRIG 203 (H) 02/27/2024    TRIG 155 (H) 07/23/2022    TRIG 144 06/22/2022     No components found for: \"CHOLHDL\"  Lab Results   Component Value Date    HGBA1C 5.7 (H) 02/27/2024     Other labs not included in the list above were reviewed either before or during this encounter.    History    Past Medical History:   Diagnosis Date   • Abscess, dental 09/13/2023   • Gastroesophageal reflux disease 09/13/2023   • History of TIA (transient ischemic attack) 10/31/2023    7/22 TIA MRI BRAIN, MRA, carotid U./[place] NEOVA vertebral artery normla variant . 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, neurology follow up   • Impaired fasting glucose 09/13/2023   • Injury of foot 09/13/2023   • Mixed anxiety and depressive disorder 09/13/2023   • Pure hypercholesterolemia 09/13/2023   • Tension headache 09/13/2023     Past Surgical History:   Procedure Laterality Date   • CT HEAD ANGIO W AND WO IV CONTRAST  7/23/2022    CT HEAD ANGIO W AND WO IV CONTRAST [facility]   • MR HEAD ANGIO WO IV CONTRAST  7/23/2022    MR HEAD ANGIO WO IV CONTRAST [facility]     Family History   Problem Relation Name Age of Onset   • Diabetes Mother     • Hypertension Mother     • Diabetes Sister     • Diabetes Brother       Social History     Socioeconomic History   • Marital status: "      Spouse name: Not on file   • Number of children: Not on file   • Years of education: Not on file   • Highest education level: Not on file   Occupational History   • Not on file   Tobacco Use   • Smoking status: Every Day     Packs/day: .25     Types: Cigarettes     Passive exposure: Current   • Smokeless tobacco: Never   Vaping Use   • Vaping Use: Never used   Substance and Sexual Activity   • Alcohol use: Yes   • Drug use: Never   • Sexual activity: Not on file   Other Topics Concern   • Not on file   Social History Narrative   • Not on file     Social Determinants of Health     Financial Resource Strain: Low Risk  (2/27/2024)    Overall Financial Resource Strain (CARDIA)    • Difficulty of Paying Living Expenses: Not hard at all   Food Insecurity: Not on file   Transportation Needs: No Transportation Needs (2/27/2024)    PRAPARE - Transportation    • Lack of Transportation (Medical): No    • Lack of Transportation (Non-Medical): No   Physical Activity: Not on file   Stress: Not on file   Social Connections: Not on file   Intimate Partner Violence: Not on file   Housing Stability: Low Risk  (2/27/2024)    Housing Stability Vital Sign    • Unable to Pay for Housing in the Last Year: No    • Number of Places Lived in the Last Year: 1    • Unstable Housing in the Last Year: No     Allergies   Allergen Reactions   • Atorvastatin Myalgia   • Ibandronate GI Upset     Current Outpatient Medications on File Prior to Visit   Medication Sig Dispense Refill   • acetaminophen (Tylenol) 325 mg tablet Take 2 tablets (650 mg) by mouth every 6 hours if needed (pain).     • albuterol 90 mcg/actuation inhaler Inhale 2 puffs every 4 hours if needed for wheezing or shortness of breath (cough). Use with spacer 18 g 0   • clopidogrel (Plavix) 75 mg tablet TAKE 1 TABLET BY MOUTH EVERY DAY 90 tablet 2   • coenzyme Q-10 100 mg capsule Take 1 capsule (100 mg) by mouth once daily. With a meal     • cyanocobalamin (Vitamin B-12)  1,000 mcg tablet Take 1 tablet (1,000 mcg) by mouth once daily.     • fluticasone furoate-vilanteroL (Breo Ellipta) 200-25 mcg/dose inhaler Inhale 1 puff once daily. 3 each 3   • lisinopriL-hydrochlorothiazide 20-25 mg tablet Take 0.5 tablets by mouth once daily.     • multivit-min/vit C/herb no.124 (AIRBORNE, ASCORBIC ACID, ORAL) Orally     • pravastatin (Pravachol) 40 mg tablet Take 1 tablet (40 mg) by mouth once daily.     • [DISCONTINUED] omeprazole OTC (PriLOSEC OTC) 20 mg EC tablet Take 1 tablet (20 mg) by mouth once daily. 2 tablets     • [DISCONTINUED] aspirin-acetaminophen-caffeine (Excedrin Extra Strength) 250-250-65 mg tablet Take 1 tablet by mouth. 1-2 times per week     • [DISCONTINUED] atorvastatin (Lipitor) 40 mg tablet Take 1 tablet (40 mg) by mouth once daily.     • [DISCONTINUED] FLUoxetine (PROzac) 20 mg capsule Take 1 capsule (20 mg) by mouth once daily in the morning.     • [DISCONTINUED] ibuprofen 600 mg tablet Take 1 tablet (600 mg) by mouth every 6 hours if needed (pain).       No current facility-administered medications on file prior to visit.     Immunization History   Administered Date(s) Administered   • Flu vaccine, quadrivalent, recombinant, preservative free, adult (FLUBLOK) 01/13/2023   • Pfizer Purple Cap SARS-CoV-2 04/05/2021, 05/03/2021   • Tdap vaccine, age 7 year and older (BOOSTRIX, ADACEL) 01/01/1999, 04/27/2014     Patient's medical history was reviewed and updated either before or during this encounter.       Morteza Elias MD

## 2024-03-07 ENCOUNTER — PATIENT OUTREACH (OUTPATIENT)
Dept: PRIMARY CARE | Facility: CLINIC | Age: 64
End: 2024-03-07

## 2024-03-07 NOTE — PROGRESS NOTES
Unable to reach patient for call back after patient's follow up appointment with PCP on 3/5     LV with call back number for patient to call if needed   If no voicemail available call attempts x 2 were made to contact the patient to assist with any questions or concerns patient may have.     Encouragement to call providers for any questions concerns or change in condition

## 2024-05-02 ENCOUNTER — TELEPHONE (OUTPATIENT)
Dept: PRIMARY CARE | Facility: CLINIC | Age: 64
End: 2024-05-02

## 2024-05-02 DIAGNOSIS — E78.00 HYPERCHOLESTEROLEMIA: ICD-10-CM

## 2024-05-02 RX ORDER — PRAVASTATIN SODIUM 40 MG/1
40 TABLET ORAL DAILY
Qty: 90 TABLET | Refills: 2 | Status: SHIPPED | OUTPATIENT
Start: 2024-05-02

## 2024-07-15 DIAGNOSIS — Z86.73 HISTORY OF TIA (TRANSIENT ISCHEMIC ATTACK): ICD-10-CM

## 2024-07-15 RX ORDER — CLOPIDOGREL BISULFATE 75 MG/1
75 TABLET ORAL DAILY
Qty: 90 TABLET | Refills: 3 | Status: SHIPPED | OUTPATIENT
Start: 2024-07-15

## 2024-08-26 DIAGNOSIS — K21.9 GASTROESOPHAGEAL REFLUX DISEASE WITHOUT ESOPHAGITIS: ICD-10-CM

## 2024-08-26 RX ORDER — PANTOPRAZOLE SODIUM 40 MG/1
TABLET, DELAYED RELEASE ORAL
Qty: 90 TABLET | Refills: 0 | Status: SHIPPED | OUTPATIENT
Start: 2024-08-26

## 2024-09-27 ENCOUNTER — TELEPHONE (OUTPATIENT)
Dept: PRIMARY CARE | Facility: CLINIC | Age: 64
End: 2024-09-27

## 2024-09-27 DIAGNOSIS — J01.90 ACUTE BACTERIAL SINUSITIS: Primary | ICD-10-CM

## 2024-09-27 DIAGNOSIS — B96.89 ACUTE BACTERIAL SINUSITIS: Primary | ICD-10-CM

## 2024-09-27 RX ORDER — AMOXICILLIN AND CLAVULANATE POTASSIUM 875; 125 MG/1; MG/1
875 TABLET, FILM COATED ORAL 2 TIMES DAILY
Qty: 14 TABLET | Refills: 0 | Status: SHIPPED | OUTPATIENT
Start: 2024-09-27 | End: 2024-10-04

## 2024-09-27 NOTE — TELEPHONE ENCOUNTER
Pt c/o facial pain, nasal drainage, eye pain x3 weeks.  Nasal saline, sudafed, coricidin not relieving symptoms.  Pt suspects sinus infection.  Asking if can Rx since appt on 10/2/24 being cancelled.  Please advise.  Ph: [phone]    [address]

## 2024-09-30 NOTE — TELEPHONE ENCOUNTER
Called to check in with pt and to advise Rx sent to pharm if they have not already reached out to her.

## 2024-10-01 ENCOUNTER — APPOINTMENT (OUTPATIENT)
Dept: PRIMARY CARE | Facility: CLINIC | Age: 64
End: 2024-10-01

## 2024-10-25 RX ORDER — OMEPRAZOLE 20 MG/1
TABLET, DELAYED RELEASE ORAL EVERY 24 HOURS
COMMUNITY
End: 2024-10-29 | Stop reason: ALTCHOICE

## 2024-10-29 ENCOUNTER — OFFICE VISIT (OUTPATIENT)
Dept: PRIMARY CARE | Facility: CLINIC | Age: 64
End: 2024-10-29
Payer: MEDICARE

## 2024-10-29 VITALS
HEART RATE: 81 BPM | HEIGHT: 61 IN | BODY MASS INDEX: 23.6 KG/M2 | SYSTOLIC BLOOD PRESSURE: 101 MMHG | DIASTOLIC BLOOD PRESSURE: 67 MMHG | TEMPERATURE: 97.4 F | WEIGHT: 125 LBS | OXYGEN SATURATION: 98 %

## 2024-10-29 DIAGNOSIS — Z86.73 HISTORY OF TIA (TRANSIENT ISCHEMIC ATTACK): ICD-10-CM

## 2024-10-29 DIAGNOSIS — Z00.00 ROUTINE GENERAL MEDICAL EXAMINATION AT HEALTH CARE FACILITY: Primary | ICD-10-CM

## 2024-10-29 DIAGNOSIS — Z00.00 MEDICARE WELCOME EXAM: ICD-10-CM

## 2024-10-29 DIAGNOSIS — Z12.31 ENCOUNTER FOR SCREENING MAMMOGRAM FOR BREAST CANCER: ICD-10-CM

## 2024-10-29 DIAGNOSIS — K21.9 GASTROESOPHAGEAL REFLUX DISEASE WITHOUT ESOPHAGITIS: ICD-10-CM

## 2024-10-29 DIAGNOSIS — Z78.0 ASYMPTOMATIC MENOPAUSAL STATE: ICD-10-CM

## 2024-10-29 DIAGNOSIS — E78.00 HYPERCHOLESTEROLEMIA: ICD-10-CM

## 2024-10-29 DIAGNOSIS — F41.8 MIXED ANXIETY AND DEPRESSIVE DISORDER: ICD-10-CM

## 2024-10-29 DIAGNOSIS — Z13.1 SCREENING FOR DIABETES MELLITUS: ICD-10-CM

## 2024-10-29 DIAGNOSIS — E55.9 VITAMIN D DEFICIENCY: ICD-10-CM

## 2024-10-29 DIAGNOSIS — I10 ESSENTIAL HYPERTENSION: ICD-10-CM

## 2024-10-29 DIAGNOSIS — Z12.11 SCREENING FOR COLON CANCER: ICD-10-CM

## 2024-10-29 DIAGNOSIS — R73.9 ELEVATED BLOOD SUGAR: ICD-10-CM

## 2024-10-29 PROBLEM — R09.1 PLEURISY: Status: RESOLVED | Noted: 2023-09-13 | Resolved: 2024-10-29

## 2024-10-29 PROBLEM — J41.1 MUCOPURULENT CHRONIC BRONCHITIS (MULTI): Status: RESOLVED | Noted: 2024-03-04 | Resolved: 2024-10-29

## 2024-10-29 PROBLEM — M79.669 CALF PAIN: Status: RESOLVED | Noted: 2024-03-04 | Resolved: 2024-10-29

## 2024-10-29 PROBLEM — S89.90XA INJURY OF LOWER EXTREMITY: Status: RESOLVED | Noted: 2024-03-04 | Resolved: 2024-10-29

## 2024-10-29 PROBLEM — S93.409A SPRAIN OF ANKLE: Status: RESOLVED | Noted: 2024-03-04 | Resolved: 2024-10-29

## 2024-10-29 PROCEDURE — 99215 OFFICE O/P EST HI 40 MIN: CPT | Performed by: NURSE PRACTITIONER

## 2024-10-29 PROCEDURE — 90662 IIV NO PRSV INCREASED AG IM: CPT | Performed by: NURSE PRACTITIONER

## 2024-10-29 RX ORDER — PRAVASTATIN SODIUM 40 MG/1
40 TABLET ORAL DAILY
Qty: 90 TABLET | Refills: 2 | Status: SHIPPED | OUTPATIENT
Start: 2024-10-29

## 2024-10-29 ASSESSMENT — ENCOUNTER SYMPTOMS
PSYCHIATRIC NEGATIVE: 1
ENDOCRINE NEGATIVE: 1
MUSCULOSKELETAL NEGATIVE: 1
DIZZINESS: 1
CONSTITUTIONAL NEGATIVE: 1
CARDIOVASCULAR NEGATIVE: 1
RESPIRATORY NEGATIVE: 1
GASTROINTESTINAL NEGATIVE: 1
HEMATOLOGIC/LYMPHATIC NEGATIVE: 1

## 2024-10-29 ASSESSMENT — PATIENT HEALTH QUESTIONNAIRE - PHQ9
1. LITTLE INTEREST OR PLEASURE IN DOING THINGS: NOT AT ALL
2. FEELING DOWN, DEPRESSED OR HOPELESS: SEVERAL DAYS
10. IF YOU CHECKED OFF ANY PROBLEMS, HOW DIFFICULT HAVE THESE PROBLEMS MADE IT FOR YOU TO DO YOUR WORK, TAKE CARE OF THINGS AT HOME, OR GET ALONG WITH OTHER PEOPLE: NOT DIFFICULT AT ALL
SUM OF ALL RESPONSES TO PHQ9 QUESTIONS 1 AND 2: 1

## 2024-10-29 ASSESSMENT — PAIN SCALES - GENERAL: PAINLEVEL_OUTOF10: 0-NO PAIN

## 2024-11-08 ENCOUNTER — HOSPITAL ENCOUNTER (OUTPATIENT)
Dept: RADIOLOGY | Facility: CLINIC | Age: 64
Discharge: HOME | End: 2024-11-08
Payer: MEDICARE

## 2024-11-08 VITALS — WEIGHT: 125 LBS | BODY MASS INDEX: 23.62 KG/M2

## 2024-11-08 DIAGNOSIS — Z78.0 ASYMPTOMATIC MENOPAUSAL STATE: ICD-10-CM

## 2024-11-08 DIAGNOSIS — Z12.31 ENCOUNTER FOR SCREENING MAMMOGRAM FOR BREAST CANCER: ICD-10-CM

## 2024-11-08 PROCEDURE — 77080 DXA BONE DENSITY AXIAL: CPT | Performed by: RADIOLOGY

## 2024-11-08 PROCEDURE — 77080 DXA BONE DENSITY AXIAL: CPT

## 2024-11-08 PROCEDURE — 77067 SCR MAMMO BI INCL CAD: CPT

## 2024-11-08 NOTE — RESULT ENCOUNTER NOTE
Left message on machine to call office per provider to discuss treatment options for osteoporosis.

## 2024-11-08 NOTE — RESULT ENCOUNTER NOTE
Let the patient know she has osteoporosis and should schedule an appt to discuss treatment such as fosamax

## 2024-11-26 DIAGNOSIS — K21.9 GASTROESOPHAGEAL REFLUX DISEASE WITHOUT ESOPHAGITIS: ICD-10-CM

## 2024-11-26 RX ORDER — PANTOPRAZOLE SODIUM 40 MG/1
40 TABLET, DELAYED RELEASE ORAL
Qty: 90 TABLET | Refills: 3 | Status: SHIPPED | OUTPATIENT
Start: 2024-11-26

## 2024-12-13 ENCOUNTER — APPOINTMENT (OUTPATIENT)
Dept: RADIOLOGY | Facility: HOSPITAL | Age: 64
End: 2024-12-13
Payer: MEDICARE

## 2024-12-27 ENCOUNTER — HOSPITAL ENCOUNTER (EMERGENCY)
Facility: HOSPITAL | Age: 64
Discharge: HOME | End: 2024-12-27
Attending: EMERGENCY MEDICINE
Payer: MEDICARE

## 2024-12-27 ENCOUNTER — APPOINTMENT (OUTPATIENT)
Dept: RADIOLOGY | Facility: HOSPITAL | Age: 64
End: 2024-12-27
Payer: MEDICARE

## 2024-12-27 VITALS
SYSTOLIC BLOOD PRESSURE: 107 MMHG | HEART RATE: 67 BPM | HEIGHT: 62 IN | RESPIRATION RATE: 18 BRPM | OXYGEN SATURATION: 98 % | BODY MASS INDEX: 21.79 KG/M2 | WEIGHT: 118.39 LBS | DIASTOLIC BLOOD PRESSURE: 67 MMHG | TEMPERATURE: 97.3 F

## 2024-12-27 DIAGNOSIS — K92.2 UPPER GI BLEEDING: Primary | ICD-10-CM

## 2024-12-27 LAB
ALBUMIN SERPL BCP-MCNC: 4.7 G/DL (ref 3.4–5)
ALP SERPL-CCNC: 59 U/L (ref 33–136)
ALT SERPL W P-5'-P-CCNC: 14 U/L (ref 7–45)
ANION GAP SERPL CALCULATED.3IONS-SCNC: 14 MMOL/L (ref 10–20)
AST SERPL W P-5'-P-CCNC: 12 U/L (ref 9–39)
BASOPHILS # BLD AUTO: 0.08 X10*3/UL (ref 0–0.1)
BASOPHILS NFR BLD AUTO: 0.6 %
BILIRUB SERPL-MCNC: 0.3 MG/DL (ref 0–1.2)
BUN SERPL-MCNC: 21 MG/DL (ref 6–23)
CALCIUM SERPL-MCNC: 9.3 MG/DL (ref 8.6–10.3)
CHLORIDE SERPL-SCNC: 106 MMOL/L (ref 98–107)
CO2 SERPL-SCNC: 21 MMOL/L (ref 21–32)
CREAT SERPL-MCNC: 0.81 MG/DL (ref 0.5–1.05)
EGFRCR SERPLBLD CKD-EPI 2021: 81 ML/MIN/1.73M*2
EOSINOPHIL # BLD AUTO: 0.31 X10*3/UL (ref 0–0.7)
EOSINOPHIL NFR BLD AUTO: 2.3 %
ERYTHROCYTE [DISTWIDTH] IN BLOOD BY AUTOMATED COUNT: 13.2 % (ref 11.5–14.5)
GLUCOSE SERPL-MCNC: 130 MG/DL (ref 74–99)
HCT VFR BLD AUTO: 36.8 % (ref 36–46)
HGB BLD-MCNC: 12.3 G/DL (ref 12–16)
IMM GRANULOCYTES # BLD AUTO: 0.05 X10*3/UL (ref 0–0.7)
IMM GRANULOCYTES NFR BLD AUTO: 0.4 % (ref 0–0.9)
LIPASE SERPL-CCNC: 27 U/L (ref 9–82)
LYMPHOCYTES # BLD AUTO: 3.21 X10*3/UL (ref 1.2–4.8)
LYMPHOCYTES NFR BLD AUTO: 23.6 %
MCH RBC QN AUTO: 29.2 PG (ref 26–34)
MCHC RBC AUTO-ENTMCNC: 33.4 G/DL (ref 32–36)
MCV RBC AUTO: 87 FL (ref 80–100)
MONOCYTES # BLD AUTO: 0.58 X10*3/UL (ref 0.1–1)
MONOCYTES NFR BLD AUTO: 4.3 %
NEUTROPHILS # BLD AUTO: 9.37 X10*3/UL (ref 1.2–7.7)
NEUTROPHILS NFR BLD AUTO: 68.8 %
NRBC BLD-RTO: 0 /100 WBCS (ref 0–0)
PLATELET # BLD AUTO: 301 X10*3/UL (ref 150–450)
POTASSIUM SERPL-SCNC: 3.2 MMOL/L (ref 3.5–5.3)
PROT SERPL-MCNC: 7.8 G/DL (ref 6.4–8.2)
RBC # BLD AUTO: 4.21 X10*6/UL (ref 4–5.2)
SODIUM SERPL-SCNC: 138 MMOL/L (ref 136–145)
WBC # BLD AUTO: 13.6 X10*3/UL (ref 4.4–11.3)

## 2024-12-27 PROCEDURE — 2500000004 HC RX 250 GENERAL PHARMACY W/ HCPCS (ALT 636 FOR OP/ED): Performed by: EMERGENCY MEDICINE

## 2024-12-27 PROCEDURE — 96361 HYDRATE IV INFUSION ADD-ON: CPT

## 2024-12-27 PROCEDURE — 80053 COMPREHEN METABOLIC PANEL: CPT | Performed by: EMERGENCY MEDICINE

## 2024-12-27 PROCEDURE — 2550000001 HC RX 255 CONTRASTS: Performed by: EMERGENCY MEDICINE

## 2024-12-27 PROCEDURE — 36415 COLL VENOUS BLD VENIPUNCTURE: CPT | Performed by: EMERGENCY MEDICINE

## 2024-12-27 PROCEDURE — 96374 THER/PROPH/DIAG INJ IV PUSH: CPT

## 2024-12-27 PROCEDURE — 96375 TX/PRO/DX INJ NEW DRUG ADDON: CPT

## 2024-12-27 PROCEDURE — 74177 CT ABD & PELVIS W/CONTRAST: CPT

## 2024-12-27 PROCEDURE — 74177 CT ABD & PELVIS W/CONTRAST: CPT | Performed by: STUDENT IN AN ORGANIZED HEALTH CARE EDUCATION/TRAINING PROGRAM

## 2024-12-27 PROCEDURE — 99285 EMERGENCY DEPT VISIT HI MDM: CPT | Mod: 25 | Performed by: EMERGENCY MEDICINE

## 2024-12-27 PROCEDURE — 85025 COMPLETE CBC W/AUTO DIFF WBC: CPT | Performed by: EMERGENCY MEDICINE

## 2024-12-27 PROCEDURE — 83690 ASSAY OF LIPASE: CPT | Performed by: EMERGENCY MEDICINE

## 2024-12-27 RX ORDER — ONDANSETRON 4 MG/1
4 TABLET, ORALLY DISINTEGRATING ORAL EVERY 8 HOURS PRN
Qty: 12 TABLET | Refills: 0 | Status: SHIPPED | OUTPATIENT
Start: 2024-12-27 | End: 2025-01-03

## 2024-12-27 RX ORDER — ONDANSETRON HYDROCHLORIDE 2 MG/ML
4 INJECTION, SOLUTION INTRAVENOUS ONCE
Status: COMPLETED | OUTPATIENT
Start: 2024-12-27 | End: 2024-12-27

## 2024-12-27 RX ORDER — PANTOPRAZOLE SODIUM 40 MG/10ML
40 INJECTION, POWDER, LYOPHILIZED, FOR SOLUTION INTRAVENOUS ONCE
Status: COMPLETED | OUTPATIENT
Start: 2024-12-27 | End: 2024-12-27

## 2024-12-27 RX ADMIN — PANTOPRAZOLE SODIUM 40 MG: 40 INJECTION, POWDER, FOR SOLUTION INTRAVENOUS at 01:15

## 2024-12-27 RX ADMIN — SODIUM CHLORIDE 500 ML: 900 INJECTION, SOLUTION INTRAVENOUS at 01:13

## 2024-12-27 RX ADMIN — ONDANSETRON 4 MG: 2 INJECTION INTRAMUSCULAR; INTRAVENOUS at 01:14

## 2024-12-27 RX ADMIN — IOHEXOL 75 ML: 350 INJECTION, SOLUTION INTRAVENOUS at 02:19

## 2024-12-27 ASSESSMENT — PAIN DESCRIPTION - PROGRESSION: CLINICAL_PROGRESSION: NOT CHANGED

## 2024-12-27 ASSESSMENT — PAIN - FUNCTIONAL ASSESSMENT: PAIN_FUNCTIONAL_ASSESSMENT: 0-10

## 2024-12-27 ASSESSMENT — PAIN SCALES - GENERAL
PAINLEVEL_OUTOF10: 0 - NO PAIN

## 2024-12-27 ASSESSMENT — COLUMBIA-SUICIDE SEVERITY RATING SCALE - C-SSRS
6. HAVE YOU EVER DONE ANYTHING, STARTED TO DO ANYTHING, OR PREPARED TO DO ANYTHING TO END YOUR LIFE?: NO
1. IN THE PAST MONTH, HAVE YOU WISHED YOU WERE DEAD OR WISHED YOU COULD GO TO SLEEP AND NOT WAKE UP?: NO
2. HAVE YOU ACTUALLY HAD ANY THOUGHTS OF KILLING YOURSELF?: NO

## 2024-12-27 NOTE — DISCHARGE INSTRUCTIONS
Thank you for choosing [facility] Emergency Department. It was my pleasure to be involved in your care today.         As of today's visit, based on reasonable likelihood, that it is safe for you to be discharged back to your residence to follow-up as an outpatient for ongoing management of your medical problem. You should follow-up with any referrals / primary provider as soon as possible. The contacts (number, addresses) are listed below.         Important:  Even though we think it is safe for you to go home, there is always a small chance that we are missing something that could require hospitalization.  Therefore it is very important that if you get worse or develops any new symptoms that you return here as soon as possible to be re-evaluated.  This includes return of symptoms that have resolved such as fainting, chest pain, or symptoms that could be warning signs for stroke important:  Even though we think it is safe for you to go home, there is always a small chance that we are missing something that could require hospitalization.  Therefore it is very important that if you get worse or develops any new symptoms that you return here as soon as possible to be re-evaluated.  This includes return of symptoms that have resolved such as fainting, chest pain, or symptoms that could be warning signs for stroke         Make sure your pharmacy and primary doctor is aware of any new medications prescribed today.          It is your responsibility to contact as soon as possible, and follow through with, any referrals you were given today. We do recommend you inform them you are a [facility] follow-up patient, as often they can better accommodate your need to be seen, provided their schedules allow. We will, and have, made every effort to ensure you have access to adequate follow-up specialists available.          All problems may not be able to be fixed in one ER visit. This is why timely ongoing care is important, and this  is a responsibility you share in. Further, you are free to follow up with any provider you choose, and this is not limited to our suggestion.          If cultures were obtained today, you will be contacted should anything result that would require further treatment. Please contact the ED at the number provided with questions.          Having trouble affording medications? Try [email or web address]! (This is not a hospital endorsed website, merely a recommendation based on my own personal experiences with [employer])

## 2024-12-27 NOTE — ED PROVIDER NOTES
HPI   Chief Complaint   Patient presents with    Vomiting Blood     Presents to ED via EMS for vomiting blood.  States she woke up just PTA with 2 episodes of vomiting blood.  States one of those episodes did contain blood clots.  States she does take Eliquis.  Vomiting dark contents upon arrival to ED.       HPI  64-year-old female presents with complaint of vomiting blood.  Patient woke up and had initial episode which is vomiting blood and then the next 1 was brownish.  She noted some blood clots.  No diarrhea.  No abdominal pain.  She did not take any medicine for symptoms.  She is on Eliquis.  Never had this before.  She denies any alcohol use.  No chest pain or shortness of breath.  No other complaints.      Patient History   Past Medical History:   Diagnosis Date    Abscess, dental 09/13/2023    CVA (cerebrovascular accident) (Multi)     Gastroesophageal reflux disease 09/13/2023    History of TIA (transient ischemic attack) 10/31/2023    7/22 TIA MRI BRAIN, MRA, carotid U./S [place] NEOVA vertebral artery normla variant . 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, neurology follow up    Hypertension     Impaired fasting glucose 09/13/2023    Injury of foot 09/13/2023    Mixed anxiety and depressive disorder 09/13/2023    Pure hypercholesterolemia 09/13/2023    Tension headache 09/13/2023     Past Surgical History:   Procedure Laterality Date    CT HEAD ANGIO W AND WO IV CONTRAST  07/23/2022    CT HEAD ANGIO W AND WO IV CONTRAST [facility]Y [facility]    HYSTERECTOMY      MR HEAD ANGIO WO IV CONTRAST  07/23/2022    MR HEAD ANGIO WO IV CONTRAST [facility]     Family History   Problem Relation Name Age of Onset    Diabetes Mother      Hypertension Mother      Diabetes Sister      Diabetes Brother       Social History     Tobacco Use    Smoking status: Every Day     Current packs/day: 0.25     Average packs/day: 0.2 packs/day for 46.0 years (11.5 ttl pk-yrs)     Types: Cigarettes      Start date: 1979     Passive exposure: Current    Smokeless tobacco: Never   Vaping Use    Vaping status: Never Used   Substance Use Topics    Alcohol use: Not Currently    Drug use: Never       Physical Exam   ED Triage Vitals [12/27/24 0102]   Temperature Heart Rate Respirations BP   36.3 °C (97.3 °F) 97 (!) 21 150/89      Pulse Ox Temp Source Heart Rate Source Patient Position   96 % Temporal Monitor --      BP Location FiO2 (%)     Left arm --       Physical Exam  General:  Awake, alert, no acute distress.  Head: Normocephalic, Atraumatic  Neck: Supple, trachea midline, no stridor  Skin: Warm and dry, no rashes   Lungs: Clear to auscultation bilaterally no acute respiratory distress, speaking in full sentences without difficulty  CV: Regular Rate Rhythm with no obvious murmurs gallops rubs noted, no jugular venous distention, no pedal edema   Abdomen: Soft, nontender, nondistended, positive bowel sounds, no peritoneal signs  Neuro:  No gross focal neurologic deficits, NIH is 0  Musculoskeletal:  Full range of motion in all 4 extremities  Psychiatric:  Alert oriented x 3, Good insight into condition.    ED Course & MDM   Diagnoses as of 12/27/24 0305   Upper GI bleeding                 No data recorded     Percy Coma Scale Score: 15 (12/27/24 0106 : Aida Vann RN)                           Medical Decision Making  Patient with Zofran, Protonix, IV fluids.  She had no return of her symptoms and she felt better.  Her laboratory studies and CT abdomen pelvis are negative.  I suspect she may have a small ulcer or gastritis.  Discussed the findings with her at bedside.  At this point I feel she is stable for discharge to home.  She is given referral to gastroenterology.  Prescription for Zofran.  The patient has Protonix at home.  Return if condition should worsen.    Procedure  Procedures     Vlad Gay, DO  12/27/24 0306

## 2025-01-03 ENCOUNTER — APPOINTMENT (OUTPATIENT)
Dept: RADIOLOGY | Facility: HOSPITAL | Age: 65
End: 2025-01-03
Payer: MEDICARE

## 2025-01-03 ENCOUNTER — HOSPITAL ENCOUNTER (OUTPATIENT)
Dept: RADIOLOGY | Facility: HOSPITAL | Age: 65
Discharge: HOME | End: 2025-01-03
Payer: MEDICARE

## 2025-01-03 DIAGNOSIS — R92.8 OTHER ABNORMAL AND INCONCLUSIVE FINDINGS ON DIAGNOSTIC IMAGING OF BREAST: ICD-10-CM

## 2025-01-03 PROCEDURE — 77061 BREAST TOMOSYNTHESIS UNI: CPT | Mod: LT

## 2025-01-03 PROCEDURE — 76642 ULTRASOUND BREAST LIMITED: CPT | Mod: LT

## 2025-01-03 NOTE — RESULT ENCOUNTER NOTE
Let the patient know that her repeat mammogram and us of her left breast shows no malignancy  - repeat mammogram in 1 year per JUAN Viera, CNP

## 2025-01-10 ENCOUNTER — LAB (OUTPATIENT)
Dept: LAB | Facility: LAB | Age: 65
End: 2025-01-10
Payer: MEDICARE

## 2025-01-10 DIAGNOSIS — E55.9 VITAMIN D DEFICIENCY: ICD-10-CM

## 2025-01-10 DIAGNOSIS — E78.00 HYPERCHOLESTEROLEMIA: ICD-10-CM

## 2025-01-10 DIAGNOSIS — I10 ESSENTIAL HYPERTENSION: ICD-10-CM

## 2025-01-10 DIAGNOSIS — Z13.1 SCREENING FOR DIABETES MELLITUS: ICD-10-CM

## 2025-01-10 DIAGNOSIS — R73.9 ELEVATED BLOOD SUGAR: ICD-10-CM

## 2025-01-10 LAB
25(OH)D3 SERPL-MCNC: 33 NG/ML (ref 30–100)
ALBUMIN SERPL BCP-MCNC: 4.6 G/DL (ref 3.4–5)
ALP SERPL-CCNC: 57 U/L (ref 33–136)
ALT SERPL W P-5'-P-CCNC: 11 U/L (ref 7–45)
ANION GAP SERPL CALC-SCNC: 13 MMOL/L (ref 10–20)
AST SERPL W P-5'-P-CCNC: 10 U/L (ref 9–39)
BASOPHILS # BLD AUTO: 0.04 X10*3/UL (ref 0–0.1)
BASOPHILS NFR BLD AUTO: 0.6 %
BILIRUB SERPL-MCNC: 0.3 MG/DL (ref 0–1.2)
BUN SERPL-MCNC: 24 MG/DL (ref 6–23)
CALCIUM SERPL-MCNC: 9 MG/DL (ref 8.6–10.3)
CHLORIDE SERPL-SCNC: 105 MMOL/L (ref 98–107)
CHOLEST SERPL-MCNC: 148 MG/DL (ref 0–199)
CHOLESTEROL/HDL RATIO: 3.3
CO2 SERPL-SCNC: 24 MMOL/L (ref 21–32)
CREAT SERPL-MCNC: 0.86 MG/DL (ref 0.5–1.05)
EGFRCR SERPLBLD CKD-EPI 2021: 75 ML/MIN/1.73M*2
EOSINOPHIL # BLD AUTO: 0.19 X10*3/UL (ref 0–0.7)
EOSINOPHIL NFR BLD AUTO: 2.9 %
ERYTHROCYTE [DISTWIDTH] IN BLOOD BY AUTOMATED COUNT: 13.4 % (ref 11.5–14.5)
GLUCOSE SERPL-MCNC: 96 MG/DL (ref 74–99)
HCT VFR BLD AUTO: 38.4 % (ref 36–46)
HDLC SERPL-MCNC: 44.3 MG/DL
HGB BLD-MCNC: 12.5 G/DL (ref 12–16)
IMM GRANULOCYTES # BLD AUTO: 0.02 X10*3/UL (ref 0–0.7)
IMM GRANULOCYTES NFR BLD AUTO: 0.3 % (ref 0–0.9)
LDLC SERPL CALC-MCNC: 70 MG/DL
LYMPHOCYTES # BLD AUTO: 1.68 X10*3/UL (ref 1.2–4.8)
LYMPHOCYTES NFR BLD AUTO: 25.7 %
MCH RBC QN AUTO: 29.5 PG (ref 26–34)
MCHC RBC AUTO-ENTMCNC: 32.6 G/DL (ref 32–36)
MCV RBC AUTO: 91 FL (ref 80–100)
MONOCYTES # BLD AUTO: 0.35 X10*3/UL (ref 0.1–1)
MONOCYTES NFR BLD AUTO: 5.4 %
NEUTROPHILS # BLD AUTO: 4.25 X10*3/UL (ref 1.2–7.7)
NEUTROPHILS NFR BLD AUTO: 65.1 %
NON HDL CHOLESTEROL: 104 MG/DL (ref 0–149)
NRBC BLD-RTO: 0 /100 WBCS (ref 0–0)
PLATELET # BLD AUTO: 334 X10*3/UL (ref 150–450)
POTASSIUM SERPL-SCNC: 3.4 MMOL/L (ref 3.5–5.3)
PROT SERPL-MCNC: 7.1 G/DL (ref 6.4–8.2)
RBC # BLD AUTO: 4.24 X10*6/UL (ref 4–5.2)
SODIUM SERPL-SCNC: 139 MMOL/L (ref 136–145)
TRIGL SERPL-MCNC: 168 MG/DL (ref 0–149)
VLDL: 34 MG/DL (ref 0–40)
WBC # BLD AUTO: 6.5 X10*3/UL (ref 4.4–11.3)

## 2025-01-10 PROCEDURE — 80053 COMPREHEN METABOLIC PANEL: CPT

## 2025-01-10 PROCEDURE — 82306 VITAMIN D 25 HYDROXY: CPT

## 2025-01-10 PROCEDURE — 85025 COMPLETE CBC W/AUTO DIFF WBC: CPT

## 2025-01-10 PROCEDURE — 83036 HEMOGLOBIN GLYCOSYLATED A1C: CPT

## 2025-01-10 PROCEDURE — 80061 LIPID PANEL: CPT

## 2025-01-11 LAB
EST. AVERAGE GLUCOSE BLD GHB EST-MCNC: 111 MG/DL
HBA1C MFR BLD: 5.5 %

## 2025-01-14 DIAGNOSIS — E87.6 HYPOKALEMIA: Primary | ICD-10-CM

## 2025-01-14 RX ORDER — POTASSIUM CHLORIDE 750 MG/1
10 TABLET, FILM COATED, EXTENDED RELEASE ORAL DAILY
Qty: 90 TABLET | Refills: 3 | Status: SHIPPED | OUTPATIENT
Start: 2025-01-14 | End: 2026-01-14

## 2025-01-15 DIAGNOSIS — Z86.73 HISTORY OF TIA (TRANSIENT ISCHEMIC ATTACK): ICD-10-CM

## 2025-01-15 DIAGNOSIS — R05.1 ACUTE COUGH: ICD-10-CM

## 2025-01-15 DIAGNOSIS — J40 BRONCHITIS: ICD-10-CM

## 2025-01-15 RX ORDER — CLOPIDOGREL BISULFATE 75 MG/1
75 TABLET ORAL DAILY
Qty: 90 TABLET | Refills: 3 | Status: SHIPPED | OUTPATIENT
Start: 2025-01-15

## 2025-01-15 RX ORDER — ALBUTEROL SULFATE 90 UG/1
2 INHALANT RESPIRATORY (INHALATION) EVERY 4 HOURS PRN
Qty: 18 G | Refills: 0 | Status: SHIPPED | OUTPATIENT
Start: 2025-01-15

## 2025-02-05 DIAGNOSIS — K21.9 GASTROESOPHAGEAL REFLUX DISEASE WITHOUT ESOPHAGITIS: ICD-10-CM

## 2025-02-05 DIAGNOSIS — I10 PRIMARY HYPERTENSION: ICD-10-CM

## 2025-02-05 NOTE — TELEPHONE ENCOUNTER
Refill - [facility] -   Lisinopril - hydrochlorothiazide 20-25  mg  Pantoprazole 40 mg NATHALIE Simms changed to pantoprazole to BID after scope on 1/22/25     LV 10/29/24 nv 10/30/25

## 2025-02-06 RX ORDER — LISINOPRIL AND HYDROCHLOROTHIAZIDE 20; 25 MG/1; MG/1
0.5 TABLET ORAL DAILY
Qty: 90 TABLET | Refills: 3 | Status: SHIPPED | OUTPATIENT
Start: 2025-02-06

## 2025-02-06 RX ORDER — PANTOPRAZOLE SODIUM 40 MG/1
40 TABLET, DELAYED RELEASE ORAL 2 TIMES DAILY
Qty: 180 TABLET | Refills: 3 | Status: SHIPPED | OUTPATIENT
Start: 2025-02-06

## 2025-07-15 DIAGNOSIS — E78.00 HYPERCHOLESTEROLEMIA: ICD-10-CM

## 2025-07-15 RX ORDER — PRAVASTATIN SODIUM 40 MG/1
40 TABLET ORAL DAILY
Qty: 90 TABLET | Refills: 2 | Status: SHIPPED | OUTPATIENT
Start: 2025-07-15